# Patient Record
Sex: FEMALE | Race: WHITE | NOT HISPANIC OR LATINO
[De-identification: names, ages, dates, MRNs, and addresses within clinical notes are randomized per-mention and may not be internally consistent; named-entity substitution may affect disease eponyms.]

---

## 2021-03-09 PROBLEM — Z00.00 ENCOUNTER FOR PREVENTIVE HEALTH EXAMINATION: Status: ACTIVE | Noted: 2021-03-09

## 2021-03-29 ENCOUNTER — NON-APPOINTMENT (OUTPATIENT)
Age: 36
End: 2021-03-29

## 2021-03-29 ENCOUNTER — APPOINTMENT (OUTPATIENT)
Dept: OBGYN | Facility: CLINIC | Age: 36
End: 2021-03-29
Payer: COMMERCIAL

## 2021-03-29 ENCOUNTER — ASOB RESULT (OUTPATIENT)
Age: 36
End: 2021-03-29

## 2021-03-29 ENCOUNTER — APPOINTMENT (OUTPATIENT)
Dept: ANTEPARTUM | Facility: CLINIC | Age: 36
End: 2021-03-29
Payer: COMMERCIAL

## 2021-03-29 VITALS
WEIGHT: 185 LBS | DIASTOLIC BLOOD PRESSURE: 90 MMHG | BODY MASS INDEX: 29.03 KG/M2 | HEIGHT: 67 IN | SYSTOLIC BLOOD PRESSURE: 140 MMHG

## 2021-03-29 DIAGNOSIS — Z78.9 OTHER SPECIFIED HEALTH STATUS: ICD-10-CM

## 2021-03-29 DIAGNOSIS — Z82.0 FAMILY HISTORY OF EPILEPSY AND OTHER DISEASES OF THE NERVOUS SYSTEM: ICD-10-CM

## 2021-03-29 DIAGNOSIS — Z82.3 FAMILY HISTORY OF STROKE: ICD-10-CM

## 2021-03-29 DIAGNOSIS — D25.9 LEIOMYOMA OF UTERUS, UNSPECIFIED: ICD-10-CM

## 2021-03-29 DIAGNOSIS — Z80.41 FAMILY HISTORY OF MALIGNANT NEOPLASM OF OVARY: ICD-10-CM

## 2021-03-29 DIAGNOSIS — Z82.49 FAMILY HISTORY OF ISCHEMIC HEART DISEASE AND OTHER DISEASES OF THE CIRCULATORY SYSTEM: ICD-10-CM

## 2021-03-29 PROCEDURE — 99072 ADDL SUPL MATRL&STAF TM PHE: CPT

## 2021-03-29 PROCEDURE — 76801 OB US < 14 WKS SINGLE FETUS: CPT

## 2021-03-29 PROCEDURE — 0500F INITIAL PRENATAL CARE VISIT: CPT

## 2021-03-29 PROCEDURE — 36415 COLL VENOUS BLD VENIPUNCTURE: CPT

## 2021-03-29 NOTE — HISTORY OF PRESENT ILLNESS
[N] : Patient does not use contraception [Monogamous (Male Partner)] : is monogamous with a male partner [Y] : Positive pregnancy history [Regular Cycle Intervals] : periods have been regular [Frequency: Q ___ days] : menstrual periods occur approximately every [unfilled] days [Menarche Age: ____] : age at menarche was [unfilled] [Menstrual Cramps] : menstrual cramps [Currently Active] : currently active [Men] : men [No] : No [LMPDate] : 1/31/2021 [MensesFreq] : 28 [MensesLength] : 5-6 [PGxTotal] : 1 [FreeTextEntry1] : 1/31/2021

## 2021-03-30 LAB
ABO + RH PNL BLD: NORMAL
BASOPHILS # BLD AUTO: 0.04 K/UL
BASOPHILS NFR BLD AUTO: 0.5 %
BLD GP AB SCN SERPL QL: NORMAL
C TRACH RRNA SPEC QL NAA+PROBE: NOT DETECTED
CMV IGG SERPL QL: <0.2 U/ML
CMV IGG SERPL-IMP: NEGATIVE
CMV IGM SERPL QL: <8 AU/ML
CMV IGM SERPL QL: NEGATIVE
CREAT SPEC-SCNC: 30 MG/DL
CREAT/PROT UR: NORMAL RATIO
EOSINOPHIL # BLD AUTO: 0.11 K/UL
EOSINOPHIL NFR BLD AUTO: 1.2 %
HBV SURFACE AG SER QL: NONREACTIVE
HCT VFR BLD CALC: 42.7 %
HCV AB SER QL: NONREACTIVE
HCV S/CO RATIO: 0.08 S/CO
HGB A MFR BLD: 97.3 %
HGB A2 MFR BLD: 2.7 %
HGB BLD-MCNC: 14 G/DL
HGB FRACT BLD-IMP: NORMAL
HIV1+2 AB SPEC QL IA.RAPID: NONREACTIVE
HSV 1+2 IGG SER IA-IMP: NEGATIVE
HSV 1+2 IGG SER IA-IMP: POSITIVE
HSV1 IGG SER QL: 2.41 INDEX
HSV2 IGG SER QL: 0.15 INDEX
IMM GRANULOCYTES NFR BLD AUTO: 0.2 %
LYMPHOCYTES # BLD AUTO: 1.83 K/UL
LYMPHOCYTES NFR BLD AUTO: 20.6 %
MAN DIFF?: NORMAL
MCHC RBC-ENTMCNC: 29.5 PG
MCHC RBC-ENTMCNC: 32.8 GM/DL
MCV RBC AUTO: 89.9 FL
MEV IGG FLD QL IA: 85.2 AU/ML
MEV IGG+IGM SER-IMP: POSITIVE
MONOCYTES # BLD AUTO: 0.68 K/UL
MONOCYTES NFR BLD AUTO: 7.7 %
N GONORRHOEA RRNA SPEC QL NAA+PROBE: NOT DETECTED
NEUTROPHILS # BLD AUTO: 6.19 K/UL
NEUTROPHILS NFR BLD AUTO: 69.8 %
PLATELET # BLD AUTO: 336 K/UL
PROT UR-MCNC: <4 MG/DL
RBC # BLD: 4.75 M/UL
RBC # FLD: 13.4 %
RUBV IGG FLD-ACNC: 2.1 INDEX
RUBV IGG SER-IMP: POSITIVE
SOURCE AMPLIFICATION: NORMAL
T GONDII AB SER-IMP: NEGATIVE
T GONDII AB SER-IMP: POSITIVE
T GONDII IGG SER QL: 18.2 IU/ML
T GONDII IGM SER QL: <3 AU/ML
T PALLIDUM AB SER QL IA: NEGATIVE
VZV AB TITR SER: POSITIVE
VZV IGG SER IF-ACNC: 598.1 INDEX
WBC # FLD AUTO: 8.87 K/UL

## 2021-03-31 LAB
ALBUMIN SERPL ELPH-MCNC: 4.4 G/DL
ALP BLD-CCNC: 46 U/L
ALT SERPL-CCNC: 25 U/L
ANION GAP SERPL CALC-SCNC: 16 MMOL/L
AST SERPL-CCNC: 25 U/L
BACTERIA UR CULT: NORMAL
BILIRUB SERPL-MCNC: 0.2 MG/DL
BUN SERPL-MCNC: 9 MG/DL
CALCIUM SERPL-MCNC: 9.7 MG/DL
CHLORIDE SERPL-SCNC: 105 MMOL/L
CO2 SERPL-SCNC: 18 MMOL/L
CREAT SERPL-MCNC: 0.77 MG/DL
GLUCOSE SERPL-MCNC: 101 MG/DL
POTASSIUM SERPL-SCNC: 3.7 MMOL/L
PROT SERPL-MCNC: 7.6 G/DL
RUBV IGM FLD-ACNC: <20 AU/ML
SODIUM SERPL-SCNC: 140 MMOL/L
TSH SERPL-ACNC: 2.83 UIU/ML
URATE SERPL-MCNC: 3.8 MG/DL

## 2021-04-01 LAB
B19V IGG SER QL IA: 4.06 INDEX
B19V IGG+IGM SER-IMP: NORMAL
B19V IGG+IGM SER-IMP: POSITIVE
B19V IGM FLD-ACNC: 0.15 INDEX
B19V IGM SER-ACNC: NEGATIVE
CYTOLOGY CVX/VAG DOC THIN PREP: NORMAL
HSV1 IGM SER QL: NORMAL TITER
HSV2 AB FLD-ACNC: NORMAL TITER
MEV IGM SER QL: <0.91 ISR
VZV IGM SER IF-ACNC: <0.91 INDEX

## 2021-04-04 ENCOUNTER — TRANSCRIPTION ENCOUNTER (OUTPATIENT)
Age: 36
End: 2021-04-04

## 2021-04-08 LAB — HEXOSAMINIDASE B CFR FIB: NEGATIVE

## 2021-04-12 ENCOUNTER — NON-APPOINTMENT (OUTPATIENT)
Age: 36
End: 2021-04-12

## 2021-04-12 ENCOUNTER — APPOINTMENT (OUTPATIENT)
Dept: OBGYN | Facility: CLINIC | Age: 36
End: 2021-04-12
Payer: COMMERCIAL

## 2021-04-12 VITALS
BODY MASS INDEX: 29.8 KG/M2 | HEIGHT: 67 IN | SYSTOLIC BLOOD PRESSURE: 140 MMHG | DIASTOLIC BLOOD PRESSURE: 80 MMHG | WEIGHT: 189.88 LBS

## 2021-04-12 PROCEDURE — 0502F SUBSEQUENT PRENATAL CARE: CPT

## 2021-04-12 PROCEDURE — 36415 COLL VENOUS BLD VENIPUNCTURE: CPT

## 2021-04-15 LAB
AR GENE MUT ANL BLD/T: NEGATIVE
CFTR MUT TESTED BLD/T: NEGATIVE
GENE DIS ANL CARRIER INTERP-IMP: NEGATIVE
SMN1 GENE MUT ANL BLD/T: NORMAL

## 2021-04-26 ENCOUNTER — NON-APPOINTMENT (OUTPATIENT)
Age: 36
End: 2021-04-26

## 2021-04-26 ENCOUNTER — APPOINTMENT (OUTPATIENT)
Dept: ANTEPARTUM | Facility: CLINIC | Age: 36
End: 2021-04-26
Payer: COMMERCIAL

## 2021-04-26 ENCOUNTER — ASOB RESULT (OUTPATIENT)
Age: 36
End: 2021-04-26

## 2021-04-26 PROCEDURE — 76813 OB US NUCHAL MEAS 1 GEST: CPT

## 2021-04-26 PROCEDURE — 99072 ADDL SUPL MATRL&STAF TM PHE: CPT

## 2021-04-26 PROCEDURE — 76801 OB US < 14 WKS SINGLE FETUS: CPT

## 2021-05-04 ENCOUNTER — APPOINTMENT (OUTPATIENT)
Dept: ANTEPARTUM | Facility: CLINIC | Age: 36
End: 2021-05-04
Payer: COMMERCIAL

## 2021-05-04 ENCOUNTER — ASOB RESULT (OUTPATIENT)
Age: 36
End: 2021-05-04

## 2021-05-04 PROCEDURE — 59015 CHORION BIOPSY: CPT

## 2021-05-04 PROCEDURE — 99072 ADDL SUPL MATRL&STAF TM PHE: CPT

## 2021-05-04 PROCEDURE — 76945 ECHO GUIDE VILLUS SAMPLING: CPT

## 2021-05-10 ENCOUNTER — NON-APPOINTMENT (OUTPATIENT)
Age: 36
End: 2021-05-10

## 2021-05-10 ENCOUNTER — APPOINTMENT (OUTPATIENT)
Dept: OBGYN | Facility: CLINIC | Age: 36
End: 2021-05-10
Payer: COMMERCIAL

## 2021-05-10 VITALS
DIASTOLIC BLOOD PRESSURE: 100 MMHG | WEIGHT: 188.13 LBS | BODY MASS INDEX: 29.46 KG/M2 | SYSTOLIC BLOOD PRESSURE: 130 MMHG

## 2021-05-10 PROCEDURE — 0502F SUBSEQUENT PRENATAL CARE: CPT

## 2021-05-13 ENCOUNTER — APPOINTMENT (OUTPATIENT)
Dept: MATERNAL FETAL MEDICINE | Facility: CLINIC | Age: 36
End: 2021-05-13
Payer: COMMERCIAL

## 2021-05-13 PROCEDURE — 99205 OFFICE O/P NEW HI 60 MIN: CPT | Mod: 95

## 2021-05-25 ENCOUNTER — APPOINTMENT (OUTPATIENT)
Dept: ANTEPARTUM | Facility: CLINIC | Age: 36
End: 2021-05-25
Payer: COMMERCIAL

## 2021-05-25 ENCOUNTER — ASOB RESULT (OUTPATIENT)
Age: 36
End: 2021-05-25

## 2021-05-25 PROCEDURE — 76811 OB US DETAILED SNGL FETUS: CPT

## 2021-05-25 PROCEDURE — 76817 TRANSVAGINAL US OBSTETRIC: CPT

## 2021-05-26 NOTE — DISCUSSION/SUMMARY
[FreeTextEntry1] : 1. Multiple risk factors for adverse pregnancy outcome including advanced maternal age, inconclusive NIPT followed by normal NIPT with a low fetal fraction, low KIM-A and chronic hypertension\par \par While the above factors increase the risk of adverse pregnancy outcomes such as early and late pregnancy loss, fetal growth restriction, placental insufficiency and preeclampsia these factor perform poorly in predicting which patients will have adverse outcomes and if they will be early in onset or late (which substantially impacts the  prognosis). In addition there is still a good chance of having an uncomplicated pregnancy. While some of these adverse pregnancy outcomes could occur the fetal/ prognosis depends on the severity of the issue, gestational age at delivery,  weight and other factors where an adverse outcome may occur but at a gestational age where the impact on long term childhood outcome is negligible. We also discussed the possibility of a worrisome outcomes such as early onset fetal growth restriction or preeclampsia where the likelihood of a more significant negative outcome is higher. Unfortunately, we are unable to predict how the pregnancy will progress. We discussed the option of , after the patient brought up the option, with a plan to revisit our discussion in the late 2nd trimester.\par \par 2. Chronic hypertension\par Suspect chronic or essential hypertension (especially with a h/o HTN in her father). HTN can present in cases of placental insufficiency but it would be rare for this to explain the new onset HTN diagnosed in early pregnancy. Preexisting hypertension is a recognized risk factor for preeclampsia, and superimposed preeclampsia develops in 20 to 50% of women. Women who develop superimposed preeclampsia have higher rates of adverse maternal-fetal outcomes, but the independent risks associated with uncomplicated chronic hypertension are less clear. Chronic HTN is associated with an increased risk of GDM, fetal growth restriction, placental abruption, and  mortality (most of this risk is attributable to superimposed preeclampsia rather than uncomplicated chronic hypertension).\par \par We discussed the general course of chronic hypertension in pregnancy including lowering of BP in the first and second trimesters with a return to near baseline values in the late-second or early-third trimester. We also discussed that serial assessment of fetal growth in the third trimester would be recommended, along with increased fetal surveillance in the late third trimester (fetal non-stress test and/or fetal biophysical profile). The signs and symptoms of preeclampsia were reviewed. Home BP surveillance recommended and BP parameters reviewed. \par \par Recommendations:\par 1. Check maternal EKG given history of chronic hypertension. Low threshold for maternal echocardiogram if any abnormalities noted.\par 2. Early fetal anatomy sonogram as scheduled\par 3. Home BP surveillance with BP diary. BP parameters reviewed.

## 2021-05-26 NOTE — HISTORY OF PRESENT ILLNESS
[Home] : at home, [unfilled] , at the time of the visit. [Medical Office: ([facility])___] : at the medical office located in  [Verbal consent obtained from patient] : the patient, [unfilled] [FreeTextEntry1] : Maternal-Fetal Medicine consultation \par Prenatal care: Dr. Montelongo\par \par Chief compliant: Multiple risk factors for adverse pregnancy outcome\par \par ARNULFO HERNÁNDEZ is a 35 year  at 14w4d (LEANNE 21) that presents for a MFM consultation due to multiple risk factors for adverse pregnancy outcome. Prenatal course significant for inconclusive NIPT with repeat demonstrating a normal result, though fetal fraction was low at 2.8%. KIM-A from 1st trimester aneuploidy screening low. The patient underwent a CVS with normal fetal karyotype, microarray pending. \par \par Medical history significant for hypertension which was diagnosed in this pregnancy and was started on labetalol 100 mg BID.\par \par Surgical history is otherwise unremarkable. Gynecologic history is notable for fibroids. Current medications include prenatal vitamins, labetalol 100 mg BID, aspirin 162 mg daily (stopped after CVS). Allergies: NKDA\par \par She works as psychotherapist. She lives with her . She denies a history of tobacco use. She denies alcohol or drug use in this pregnancy. She has never received blood products but is willing to receive them if needed. \par \par Family history is notable for HTN in her father. Mother has a cardiac arrhythmia and h/o DVTs in pregnancy. Her older brother has ADHD and HTN. She denies a family history of birth defects, mental retardation, developmental delay or genetic disorders.

## 2021-06-01 ENCOUNTER — ASOB RESULT (OUTPATIENT)
Age: 36
End: 2021-06-01

## 2021-06-01 ENCOUNTER — APPOINTMENT (OUTPATIENT)
Dept: ANTEPARTUM | Facility: CLINIC | Age: 36
End: 2021-06-01
Payer: COMMERCIAL

## 2021-06-01 PROCEDURE — 59000 AMNIOCENTESIS DIAGNOSTIC: CPT

## 2021-06-01 PROCEDURE — 76946 ECHO GUIDE FOR AMNIOCENTESIS: CPT

## 2021-06-03 ENCOUNTER — NON-APPOINTMENT (OUTPATIENT)
Age: 36
End: 2021-06-03

## 2021-06-03 ENCOUNTER — APPOINTMENT (OUTPATIENT)
Dept: OBGYN | Facility: CLINIC | Age: 36
End: 2021-06-03
Payer: COMMERCIAL

## 2021-06-03 ENCOUNTER — APPOINTMENT (OUTPATIENT)
Dept: MATERNAL FETAL MEDICINE | Facility: CLINIC | Age: 36
End: 2021-06-03
Payer: COMMERCIAL

## 2021-06-03 VITALS
BODY MASS INDEX: 29.03 KG/M2 | SYSTOLIC BLOOD PRESSURE: 122 MMHG | DIASTOLIC BLOOD PRESSURE: 78 MMHG | WEIGHT: 185 LBS | HEIGHT: 67 IN

## 2021-06-03 PROCEDURE — 0502F SUBSEQUENT PRENATAL CARE: CPT

## 2021-06-03 PROCEDURE — 99214 OFFICE O/P EST MOD 30 MIN: CPT | Mod: 95

## 2021-06-04 NOTE — DISCUSSION/SUMMARY
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 We reviewed the importance of amniocentesis to confirm if confined placental mosaicism (CPM), the results of which are pending. We also discussed the importance of follow-up ultrasounds to evaluate for any evidence of fetal malformations. We reviewed general risks and management of confined placental mosaicism.\par \par Ms. Nathan had several questions related to the possibility of a cell-line in the fetus which could still be mosaic and not detected on amniocentesis. In general we reviewed the favorable prognosis of a confined placental mosaicism though this also depends on the type of CPM, 1, 2 or 3. At this time the type of CPM could not be clarified with [facility]/genetic counseling. All her questions were answered to the best of my ability. A consultation with a medical geneticist was recommended and referral information for several providers given.\par \par While CPM, maternal age and cHTN increase the risk of adverse pregnancy outcomes such as early and late pregnancy loss, fetal growth restriction, placental insufficiency and preeclampsia these factor perform poorly in predicting which patients will have adverse outcomes and if they will be early in onset or late (which substantially impacts the  prognosis). In addition there is still a good chance of having an uncomplicated pregnancy. While some of these adverse pregnancy outcomes could occur the fetal/ prognosis depends on the severity of the issue, gestational age at delivery,  weight and other factors where an adverse outcome may occur but at a gestational age where the impact on long term childhood outcome is negligible. We also discussed the possibility of a worrisome outcomes such as early onset fetal growth restriction or preeclampsia where the likelihood of a more significant negative outcome is higher. Unfortunately, we are unable to predict how the pregnancy will progress. We discussed the option of  again with a plan to revisit our discussion in the late 2nd trimester.\par \par \par Recommendations:\par 1. Check maternal EKG given history of chronic hypertension. Low threshold for maternal echocardiogram if any abnormalities noted.\par 2. Continue home BP surveillance with BP diary. BP parameters reviewed. \par 3. 20-21 week anatomy sonogram\par 4. Consultation with a medical geneticist once results of amniocentesis including karyotype and microarray available.

## 2021-06-04 NOTE — DATA REVIEWED
[FreeTextEntry1] : BP log reviewed today (6/3), BPs in desired range\par \par 3/2021 labs\par Serum creatinine 0.77\par TSH 2.83\par Spot urine protein to creatinine ratio <0.133\par \par 5/2021\par CVS: Normal male karyotype, microarray low-level mosaicism T18

## 2021-06-04 NOTE — HISTORY OF PRESENT ILLNESS
[Home] : at home, [unfilled] , at the time of the visit. [Medical Office: ([facility])___] : at the medical office located in  [Verbal consent obtained from patient] : the patient, [unfilled] [FreeTextEntry1] : Maternal-Fetal Medicine consultation \par Prenatal care: Dr. Montelongo\par \par Chief compliant: Multiple risk factors for adverse pregnancy outcome\par \par ARNULFO HERNÁNDEZ is a 35 year  at 17w4d (LEANNE 21) that presents for a MFM consultation due to multiple risk factors for adverse pregnancy outcome. Since our last consultation CVS microarray notable for low-level mosaicism with T18. She had emailed me with multiple questions related to this finding and we set up a follow-up consultation to discuss in more detail.\par \par \par AP issues:\par 1. 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 cHTN, labetalol 100 mg BID, performing home BP monitoring\par \par \par Height: 5'8" Prepregnancy weight: ~, 185#\par BMI: 28

## 2021-06-08 LAB
1ST TRIMESTER DATA: NORMAL
2ND TRIMESTER DATA: NORMAL
ADDENDUM DOC: NORMAL
AFP PNL SERPL: NORMAL
AFP SERPL-ACNC: NORMAL
AFP SERPL-ACNC: NORMAL
B-HCG FREE SERPL-MCNC: NORMAL
CLINICAL BIOCHEMIST REVIEW: NORMAL
FREE BETA HCG 1ST TRIMESTER: NORMAL
INHIBIN A SERPL-MCNC: NORMAL
NOTES NTD: NORMAL
NT: NORMAL
PAPP-A SERPL-ACNC: NORMAL
U ESTRIOL SERPL-SCNC: NORMAL

## 2021-06-23 ENCOUNTER — NON-APPOINTMENT (OUTPATIENT)
Age: 36
End: 2021-06-23

## 2021-06-23 ENCOUNTER — APPOINTMENT (OUTPATIENT)
Dept: OBGYN | Facility: CLINIC | Age: 36
End: 2021-06-23
Payer: COMMERCIAL

## 2021-06-23 VITALS
SYSTOLIC BLOOD PRESSURE: 110 MMHG | BODY MASS INDEX: 30.64 KG/M2 | WEIGHT: 195.63 LBS | DIASTOLIC BLOOD PRESSURE: 80 MMHG

## 2021-06-23 PROCEDURE — 0502F SUBSEQUENT PRENATAL CARE: CPT

## 2021-06-30 ENCOUNTER — ASOB RESULT (OUTPATIENT)
Age: 36
End: 2021-06-30

## 2021-06-30 ENCOUNTER — APPOINTMENT (OUTPATIENT)
Dept: ANTEPARTUM | Facility: CLINIC | Age: 36
End: 2021-06-30
Payer: COMMERCIAL

## 2021-06-30 PROCEDURE — 76816 OB US FOLLOW-UP PER FETUS: CPT

## 2021-07-16 ENCOUNTER — NON-APPOINTMENT (OUTPATIENT)
Age: 36
End: 2021-07-16

## 2021-07-19 ENCOUNTER — NON-APPOINTMENT (OUTPATIENT)
Age: 36
End: 2021-07-19

## 2021-07-19 ENCOUNTER — APPOINTMENT (OUTPATIENT)
Dept: OBGYN | Facility: CLINIC | Age: 36
End: 2021-07-19
Payer: COMMERCIAL

## 2021-07-19 VITALS
WEIGHT: 196 LBS | BODY MASS INDEX: 30.76 KG/M2 | HEIGHT: 67 IN | DIASTOLIC BLOOD PRESSURE: 60 MMHG | SYSTOLIC BLOOD PRESSURE: 120 MMHG

## 2021-07-19 PROCEDURE — 0502F SUBSEQUENT PRENATAL CARE: CPT

## 2021-07-20 LAB
BASOPHILS # BLD AUTO: 0.03 K/UL
BASOPHILS NFR BLD AUTO: 0.3 %
EOSINOPHIL # BLD AUTO: 0.24 K/UL
EOSINOPHIL NFR BLD AUTO: 2.1 %
ESTIMATED AVERAGE GLUCOSE: 100 MG/DL
GLUCOSE 1H P 50 G GLC PO SERPL-MCNC: 97 MG/DL
HBA1C MFR BLD HPLC: 5.1 %
HCT VFR BLD CALC: 39.6 %
HGB BLD-MCNC: 12.2 G/DL
IMM GRANULOCYTES NFR BLD AUTO: 0.7 %
LYMPHOCYTES # BLD AUTO: 1.53 K/UL
LYMPHOCYTES NFR BLD AUTO: 13.2 %
MAN DIFF?: NORMAL
MCHC RBC-ENTMCNC: 29.8 PG
MCHC RBC-ENTMCNC: 30.8 GM/DL
MCV RBC AUTO: 96.8 FL
MONOCYTES # BLD AUTO: 0.65 K/UL
MONOCYTES NFR BLD AUTO: 5.6 %
NEUTROPHILS # BLD AUTO: 9.08 K/UL
NEUTROPHILS NFR BLD AUTO: 78.1 %
PLATELET # BLD AUTO: 330 K/UL
RBC # BLD: 4.09 M/UL
RBC # FLD: 14.2 %
WBC # FLD AUTO: 11.61 K/UL

## 2021-07-21 LAB — BACTERIA UR CULT: NORMAL

## 2021-07-26 ENCOUNTER — ASOB RESULT (OUTPATIENT)
Age: 36
End: 2021-07-26

## 2021-07-26 ENCOUNTER — APPOINTMENT (OUTPATIENT)
Dept: ANTEPARTUM | Facility: CLINIC | Age: 36
End: 2021-07-26
Payer: COMMERCIAL

## 2021-07-26 PROCEDURE — 76816 OB US FOLLOW-UP PER FETUS: CPT

## 2021-07-26 PROCEDURE — 76819 FETAL BIOPHYS PROFIL W/O NST: CPT

## 2021-08-11 ENCOUNTER — NON-APPOINTMENT (OUTPATIENT)
Age: 36
End: 2021-08-11

## 2021-08-12 ENCOUNTER — APPOINTMENT (OUTPATIENT)
Dept: OBGYN | Facility: CLINIC | Age: 36
End: 2021-08-12
Payer: COMMERCIAL

## 2021-08-12 VITALS — BODY MASS INDEX: 31.48 KG/M2 | WEIGHT: 201 LBS | SYSTOLIC BLOOD PRESSURE: 130 MMHG | DIASTOLIC BLOOD PRESSURE: 90 MMHG

## 2021-08-12 PROCEDURE — 0502F SUBSEQUENT PRENATAL CARE: CPT

## 2021-08-16 LAB
ALBUMIN SERPL ELPH-MCNC: 3.9 G/DL
ALBUMIN SERPL ELPH-MCNC: 3.9 G/DL
ALP BLD-CCNC: 53 U/L
ALT SERPL-CCNC: 20 U/L
ANION GAP SERPL CALC-SCNC: 12 MMOL/L
AST SERPL-CCNC: 19 U/L
BASOPHILS # BLD AUTO: 0.04 K/UL
BASOPHILS NFR BLD AUTO: 0.4 %
BILIRUB DIRECT SERPL-MCNC: 0.1 MG/DL
BILIRUB INDIRECT SERPL-MCNC: 0.1 MG/DL
BILIRUB SERPL-MCNC: 0.2 MG/DL
BUN SERPL-MCNC: 5 MG/DL
CALCIUM SERPL-MCNC: 9.5 MG/DL
CHLORIDE SERPL-SCNC: 103 MMOL/L
CO2 SERPL-SCNC: 22 MMOL/L
CREAT SERPL-MCNC: 0.54 MG/DL
EOSINOPHIL # BLD AUTO: 0.22 K/UL
EOSINOPHIL NFR BLD AUTO: 2.2 %
GLUCOSE SERPL-MCNC: 102 MG/DL
HCT VFR BLD CALC: 40 %
HGB BLD-MCNC: 12.2 G/DL
IMM GRANULOCYTES NFR BLD AUTO: 0.9 %
LYMPHOCYTES # BLD AUTO: 1.43 K/UL
LYMPHOCYTES NFR BLD AUTO: 14.3 %
MAN DIFF?: NORMAL
MCHC RBC-ENTMCNC: 30 PG
MCHC RBC-ENTMCNC: 30.5 GM/DL
MCV RBC AUTO: 98.5 FL
MONOCYTES # BLD AUTO: 0.57 K/UL
MONOCYTES NFR BLD AUTO: 5.7 %
NEUTROPHILS # BLD AUTO: 7.68 K/UL
NEUTROPHILS NFR BLD AUTO: 76.5 %
PHOSPHATE SERPL-MCNC: 3.6 MG/DL
PLATELET # BLD AUTO: 323 K/UL
POTASSIUM SERPL-SCNC: 4.5 MMOL/L
PROT SERPL-MCNC: 6.5 G/DL
RBC # BLD: 4.06 M/UL
RBC # FLD: 14.3 %
SODIUM SERPL-SCNC: 137 MMOL/L
URATE SERPL-MCNC: 3.7 MG/DL
WBC # FLD AUTO: 10.03 K/UL

## 2021-08-18 LAB
CREAT 24H UR-MCNC: 0.8 G/24 H
CREAT ?TM UR-MCNC: 49 MG/DL
PROT 24H UR-MRATE: 7 MG/DL
PROT ?TM UR-MCNC: 24 HR
PROT UR-MCNC: 112 MG/24 H
SPECIMEN VOL 24H UR: 1600 ML

## 2021-08-23 ENCOUNTER — APPOINTMENT (OUTPATIENT)
Dept: ANTEPARTUM | Facility: CLINIC | Age: 36
End: 2021-08-23
Payer: COMMERCIAL

## 2021-08-23 ENCOUNTER — ASOB RESULT (OUTPATIENT)
Age: 36
End: 2021-08-23

## 2021-08-23 PROCEDURE — 76816 OB US FOLLOW-UP PER FETUS: CPT

## 2021-08-23 PROCEDURE — 76819 FETAL BIOPHYS PROFIL W/O NST: CPT

## 2021-08-26 ENCOUNTER — APPOINTMENT (OUTPATIENT)
Dept: OBGYN | Facility: CLINIC | Age: 36
End: 2021-08-26
Payer: COMMERCIAL

## 2021-08-26 VITALS
BODY MASS INDEX: 32.15 KG/M2 | SYSTOLIC BLOOD PRESSURE: 120 MMHG | WEIGHT: 205.25 LBS | DIASTOLIC BLOOD PRESSURE: 80 MMHG

## 2021-08-26 PROCEDURE — 0502F SUBSEQUENT PRENATAL CARE: CPT

## 2021-09-08 ENCOUNTER — APPOINTMENT (OUTPATIENT)
Dept: OBGYN | Facility: CLINIC | Age: 36
End: 2021-09-08
Payer: COMMERCIAL

## 2021-09-08 VITALS — WEIGHT: 205 LBS | BODY MASS INDEX: 32.11 KG/M2 | SYSTOLIC BLOOD PRESSURE: 118 MMHG | DIASTOLIC BLOOD PRESSURE: 86 MMHG

## 2021-09-08 PROCEDURE — 0502F SUBSEQUENT PRENATAL CARE: CPT

## 2021-09-20 ENCOUNTER — APPOINTMENT (OUTPATIENT)
Dept: ANTEPARTUM | Facility: CLINIC | Age: 36
End: 2021-09-20
Payer: COMMERCIAL

## 2021-09-20 ENCOUNTER — ASOB RESULT (OUTPATIENT)
Age: 36
End: 2021-09-20

## 2021-09-20 PROCEDURE — 76819 FETAL BIOPHYS PROFIL W/O NST: CPT

## 2021-09-20 PROCEDURE — 76816 OB US FOLLOW-UP PER FETUS: CPT

## 2021-09-21 ENCOUNTER — NON-APPOINTMENT (OUTPATIENT)
Age: 36
End: 2021-09-21

## 2021-09-21 ENCOUNTER — APPOINTMENT (OUTPATIENT)
Dept: OBGYN | Facility: CLINIC | Age: 36
End: 2021-09-21
Payer: COMMERCIAL

## 2021-09-21 VITALS
WEIGHT: 211.38 LBS | BODY MASS INDEX: 33.11 KG/M2 | SYSTOLIC BLOOD PRESSURE: 140 MMHG | DIASTOLIC BLOOD PRESSURE: 80 MMHG

## 2021-09-21 PROCEDURE — 0502F SUBSEQUENT PRENATAL CARE: CPT

## 2021-09-28 ENCOUNTER — APPOINTMENT (OUTPATIENT)
Dept: OBGYN | Facility: CLINIC | Age: 36
End: 2021-09-28
Payer: COMMERCIAL

## 2021-09-28 VITALS
BODY MASS INDEX: 32.56 KG/M2 | DIASTOLIC BLOOD PRESSURE: 100 MMHG | SYSTOLIC BLOOD PRESSURE: 130 MMHG | WEIGHT: 207.88 LBS

## 2021-09-28 PROCEDURE — 0502F SUBSEQUENT PRENATAL CARE: CPT

## 2021-10-04 ENCOUNTER — ASOB RESULT (OUTPATIENT)
Age: 36
End: 2021-10-04

## 2021-10-04 ENCOUNTER — APPOINTMENT (OUTPATIENT)
Dept: ANTEPARTUM | Facility: CLINIC | Age: 36
End: 2021-10-04
Payer: COMMERCIAL

## 2021-10-04 PROCEDURE — 76816 OB US FOLLOW-UP PER FETUS: CPT

## 2021-10-05 ENCOUNTER — APPOINTMENT (OUTPATIENT)
Dept: MATERNAL FETAL MEDICINE | Facility: CLINIC | Age: 36
End: 2021-10-05
Payer: COMMERCIAL

## 2021-10-05 PROCEDURE — 99213 OFFICE O/P EST LOW 20 MIN: CPT

## 2021-10-05 NOTE — HISTORY OF PRESENT ILLNESS
[FreeTextEntry1] : Maternal-Fetal Medicine consultation \par Prenatal care: Dr. Montelongo\par \par Chief compliant: Multiple risk factors for adverse pregnancy outcome\par \par ARNULFO HERNÁNDEZ is a 35 year  at 33w6d (LEANNE 21) that presents for a MFM consultation due to multiple risk factors for adverse pregnancy outcome. 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 \par \par \par AP issues:\par 1. 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 \par Fetal growth has been normal. Now undergoing weekly fetal surveillance. \par \par 2. cHTN, labetalol 100 mg BID, performing home BP monitoring. Increased to 300 mg TID last week. \par Signs and symptoms of preeclampsia reviewed. \par Delivery planned in 38th week\par \par Wt 208, /84, HR 79\par \par Height: 5'8" Prepregnancy weight: ~, 185#\par BMI: 28

## 2021-10-05 NOTE — DATA REVIEWED
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

## 2021-10-05 NOTE — DISCUSSION/SUMMARY
[FreeTextEntry1] : \par \par Recommendations:\par 1. EKG ordered\par 2. Continue home BP surveillance with BP diary. BP parameters reviewed. Preeclamptic precautions reviewed

## 2021-10-11 ENCOUNTER — APPOINTMENT (OUTPATIENT)
Dept: ANTEPARTUM | Facility: CLINIC | Age: 36
End: 2021-10-11
Payer: COMMERCIAL

## 2021-10-11 ENCOUNTER — NON-APPOINTMENT (OUTPATIENT)
Age: 36
End: 2021-10-11

## 2021-10-11 ENCOUNTER — APPOINTMENT (OUTPATIENT)
Dept: OBGYN | Facility: CLINIC | Age: 36
End: 2021-10-11
Payer: COMMERCIAL

## 2021-10-11 ENCOUNTER — ASOB RESULT (OUTPATIENT)
Age: 36
End: 2021-10-11

## 2021-10-11 ENCOUNTER — LABORATORY RESULT (OUTPATIENT)
Age: 36
End: 2021-10-11

## 2021-10-11 VITALS
SYSTOLIC BLOOD PRESSURE: 130 MMHG | DIASTOLIC BLOOD PRESSURE: 80 MMHG | BODY MASS INDEX: 32.64 KG/M2 | WEIGHT: 208.38 LBS

## 2021-10-11 PROCEDURE — 0502F SUBSEQUENT PRENATAL CARE: CPT

## 2021-10-11 PROCEDURE — 76816 OB US FOLLOW-UP PER FETUS: CPT

## 2021-10-11 PROCEDURE — 76818 FETAL BIOPHYS PROFILE W/NST: CPT

## 2021-10-14 ENCOUNTER — APPOINTMENT (OUTPATIENT)
Dept: HEART AND VASCULAR | Facility: CLINIC | Age: 36
End: 2021-10-14

## 2021-10-14 ENCOUNTER — APPOINTMENT (OUTPATIENT)
Dept: OBGYN | Facility: CLINIC | Age: 36
End: 2021-10-14

## 2021-10-18 ENCOUNTER — NON-APPOINTMENT (OUTPATIENT)
Age: 36
End: 2021-10-18

## 2021-10-18 ENCOUNTER — APPOINTMENT (OUTPATIENT)
Dept: OBGYN | Facility: CLINIC | Age: 36
End: 2021-10-18
Payer: COMMERCIAL

## 2021-10-18 ENCOUNTER — APPOINTMENT (OUTPATIENT)
Dept: ANTEPARTUM | Facility: CLINIC | Age: 36
End: 2021-10-18
Payer: COMMERCIAL

## 2021-10-18 ENCOUNTER — ASOB RESULT (OUTPATIENT)
Age: 36
End: 2021-10-18

## 2021-10-18 VITALS
DIASTOLIC BLOOD PRESSURE: 80 MMHG | SYSTOLIC BLOOD PRESSURE: 120 MMHG | WEIGHT: 210.38 LBS | BODY MASS INDEX: 32.95 KG/M2

## 2021-10-18 PROCEDURE — 76818 FETAL BIOPHYS PROFILE W/NST: CPT

## 2021-10-18 PROCEDURE — 76816 OB US FOLLOW-UP PER FETUS: CPT

## 2021-10-18 PROCEDURE — 0502F SUBSEQUENT PRENATAL CARE: CPT

## 2021-10-25 ENCOUNTER — ASOB RESULT (OUTPATIENT)
Age: 36
End: 2021-10-25

## 2021-10-25 ENCOUNTER — APPOINTMENT (OUTPATIENT)
Dept: ANTEPARTUM | Facility: CLINIC | Age: 36
End: 2021-10-25
Payer: COMMERCIAL

## 2021-10-25 PROCEDURE — 76816 OB US FOLLOW-UP PER FETUS: CPT

## 2021-10-25 PROCEDURE — 76818 FETAL BIOPHYS PROFILE W/NST: CPT

## 2021-10-26 ENCOUNTER — NON-APPOINTMENT (OUTPATIENT)
Age: 36
End: 2021-10-26

## 2021-10-26 ENCOUNTER — LABORATORY RESULT (OUTPATIENT)
Age: 36
End: 2021-10-26

## 2021-10-26 ENCOUNTER — APPOINTMENT (OUTPATIENT)
Dept: OBGYN | Facility: CLINIC | Age: 36
End: 2021-10-26
Payer: COMMERCIAL

## 2021-10-26 ENCOUNTER — OUTPATIENT (OUTPATIENT)
Dept: OUTPATIENT SERVICES | Facility: HOSPITAL | Age: 36
LOS: 1 days | End: 2021-10-26
Payer: COMMERCIAL

## 2021-10-26 VITALS
BODY MASS INDEX: 32.87 KG/M2 | WEIGHT: 209.88 LBS | DIASTOLIC BLOOD PRESSURE: 70 MMHG | SYSTOLIC BLOOD PRESSURE: 120 MMHG

## 2021-10-26 DIAGNOSIS — Z01.818 ENCOUNTER FOR OTHER PREPROCEDURAL EXAMINATION: ICD-10-CM

## 2021-10-26 LAB
ALBUMIN SERPL ELPH-MCNC: 3.7 G/DL — SIGNIFICANT CHANGE UP (ref 3.3–5)
ALP SERPL-CCNC: 73 U/L — SIGNIFICANT CHANGE UP (ref 40–120)
ALT FLD-CCNC: 10 U/L — SIGNIFICANT CHANGE UP (ref 10–45)
ANION GAP SERPL CALC-SCNC: 11 MMOL/L — SIGNIFICANT CHANGE UP (ref 5–17)
AST SERPL-CCNC: 16 U/L — SIGNIFICANT CHANGE UP (ref 10–40)
BILIRUB SERPL-MCNC: 0.3 MG/DL — SIGNIFICANT CHANGE UP (ref 0.2–1.2)
BLD GP AB SCN SERPL QL: NEGATIVE — SIGNIFICANT CHANGE UP
BLD GP AB SCN SERPL QL: NEGATIVE — SIGNIFICANT CHANGE UP
BUN SERPL-MCNC: 7 MG/DL — SIGNIFICANT CHANGE UP (ref 7–23)
CALCIUM SERPL-MCNC: 9.3 MG/DL — SIGNIFICANT CHANGE UP (ref 8.4–10.5)
CHLORIDE SERPL-SCNC: 102 MMOL/L — SIGNIFICANT CHANGE UP (ref 96–108)
CO2 SERPL-SCNC: 22 MMOL/L — SIGNIFICANT CHANGE UP (ref 22–31)
CREAT SERPL-MCNC: 0.61 MG/DL — SIGNIFICANT CHANGE UP (ref 0.5–1.3)
GLUCOSE SERPL-MCNC: 87 MG/DL — SIGNIFICANT CHANGE UP (ref 70–99)
POTASSIUM SERPL-MCNC: 4.6 MMOL/L — SIGNIFICANT CHANGE UP (ref 3.5–5.3)
POTASSIUM SERPL-SCNC: 4.6 MMOL/L — SIGNIFICANT CHANGE UP (ref 3.5–5.3)
PROT SERPL-MCNC: 6.3 G/DL — SIGNIFICANT CHANGE UP (ref 6–8.3)
RH IG SCN BLD-IMP: POSITIVE — SIGNIFICANT CHANGE UP
RH IG SCN BLD-IMP: POSITIVE — SIGNIFICANT CHANGE UP
SODIUM SERPL-SCNC: 135 MMOL/L — SIGNIFICANT CHANGE UP (ref 135–145)

## 2021-10-26 PROCEDURE — 86900 BLOOD TYPING SEROLOGIC ABO: CPT

## 2021-10-26 PROCEDURE — 86780 TREPONEMA PALLIDUM: CPT

## 2021-10-26 PROCEDURE — 85025 COMPLETE CBC W/AUTO DIFF WBC: CPT

## 2021-10-26 PROCEDURE — 80053 COMPREHEN METABOLIC PANEL: CPT

## 2021-10-26 PROCEDURE — 86850 RBC ANTIBODY SCREEN: CPT

## 2021-10-26 PROCEDURE — 86901 BLOOD TYPING SEROLOGIC RH(D): CPT

## 2021-10-26 PROCEDURE — 0502F SUBSEQUENT PRENATAL CARE: CPT

## 2021-10-27 ENCOUNTER — INPATIENT (INPATIENT)
Facility: HOSPITAL | Age: 36
LOS: 8 days | Discharge: HOME | End: 2021-11-05
Attending: OBSTETRICS & GYNECOLOGY | Admitting: OBSTETRICS & GYNECOLOGY
Payer: COMMERCIAL

## 2021-10-27 ENCOUNTER — TRANSCRIPTION ENCOUNTER (OUTPATIENT)
Age: 36
End: 2021-10-27

## 2021-10-27 VITALS — HEIGHT: 68 IN | WEIGHT: 209 LBS

## 2021-10-27 LAB
ALBUMIN SERPL ELPH-MCNC: 3.6 G/DL — SIGNIFICANT CHANGE UP (ref 3.3–5)
ALP SERPL-CCNC: 76 U/L — SIGNIFICANT CHANGE UP (ref 40–120)
ALT FLD-CCNC: 12 U/L — SIGNIFICANT CHANGE UP (ref 10–45)
ANION GAP SERPL CALC-SCNC: 11 MMOL/L — SIGNIFICANT CHANGE UP (ref 5–17)
APPEARANCE UR: CLEAR — SIGNIFICANT CHANGE UP
APTT BLD: 24.7 SEC — LOW (ref 27.5–35.5)
AST SERPL-CCNC: 18 U/L — SIGNIFICANT CHANGE UP (ref 10–40)
BASOPHILS # BLD AUTO: 0.03 K/UL — SIGNIFICANT CHANGE UP (ref 0–0.2)
BASOPHILS NFR BLD AUTO: 0.4 % — SIGNIFICANT CHANGE UP (ref 0–2)
BILIRUB SERPL-MCNC: 0.3 MG/DL — SIGNIFICANT CHANGE UP (ref 0.2–1.2)
BILIRUB UR-MCNC: NEGATIVE — SIGNIFICANT CHANGE UP
BLD GP AB SCN SERPL QL: NEGATIVE — SIGNIFICANT CHANGE UP
BUN SERPL-MCNC: 7 MG/DL — SIGNIFICANT CHANGE UP (ref 7–23)
CALCIUM SERPL-MCNC: 9.7 MG/DL — SIGNIFICANT CHANGE UP (ref 8.4–10.5)
CHLORIDE SERPL-SCNC: 103 MMOL/L — SIGNIFICANT CHANGE UP (ref 96–108)
CO2 SERPL-SCNC: 20 MMOL/L — LOW (ref 22–31)
COLOR SPEC: YELLOW — SIGNIFICANT CHANGE UP
COVID-19 SPIKE DOMAIN AB INTERP: POSITIVE
COVID-19 SPIKE DOMAIN ANTIBODY RESULT: >250 U/ML — HIGH
CREAT ?TM UR-MCNC: 55 MG/DL — SIGNIFICANT CHANGE UP
CREAT SERPL-MCNC: 0.53 MG/DL — SIGNIFICANT CHANGE UP (ref 0.5–1.3)
DIFF PNL FLD: NEGATIVE — SIGNIFICANT CHANGE UP
EOSINOPHIL # BLD AUTO: 0.06 K/UL — SIGNIFICANT CHANGE UP (ref 0–0.5)
EOSINOPHIL NFR BLD AUTO: 0.8 % — SIGNIFICANT CHANGE UP (ref 0–6)
FIBRINOGEN PPP-MCNC: 457 MG/DL — HIGH (ref 258–438)
GLUCOSE SERPL-MCNC: 132 MG/DL — HIGH (ref 70–99)
GLUCOSE UR QL: NEGATIVE — SIGNIFICANT CHANGE UP
HCT VFR BLD CALC: 37 % — SIGNIFICANT CHANGE UP (ref 34.5–45)
HGB BLD-MCNC: 12.1 G/DL — SIGNIFICANT CHANGE UP (ref 11.5–15.5)
IMM GRANULOCYTES NFR BLD AUTO: 0.8 % — SIGNIFICANT CHANGE UP (ref 0–1.5)
INR BLD: 0.93 — SIGNIFICANT CHANGE UP (ref 0.88–1.16)
KETONES UR-MCNC: NEGATIVE — SIGNIFICANT CHANGE UP
LDH SERPL L TO P-CCNC: 157 U/L — SIGNIFICANT CHANGE UP (ref 50–242)
LEUKOCYTE ESTERASE UR-ACNC: NEGATIVE — SIGNIFICANT CHANGE UP
LYMPHOCYTES # BLD AUTO: 1.24 K/UL — SIGNIFICANT CHANGE UP (ref 1–3.3)
LYMPHOCYTES # BLD AUTO: 16 % — SIGNIFICANT CHANGE UP (ref 13–44)
MCHC RBC-ENTMCNC: 28.9 PG — SIGNIFICANT CHANGE UP (ref 27–34)
MCHC RBC-ENTMCNC: 32.7 GM/DL — SIGNIFICANT CHANGE UP (ref 32–36)
MCV RBC AUTO: 88.3 FL — SIGNIFICANT CHANGE UP (ref 80–100)
MONOCYTES # BLD AUTO: 0.44 K/UL — SIGNIFICANT CHANGE UP (ref 0–0.9)
MONOCYTES NFR BLD AUTO: 5.7 % — SIGNIFICANT CHANGE UP (ref 2–14)
NEUTROPHILS # BLD AUTO: 5.94 K/UL — SIGNIFICANT CHANGE UP (ref 1.8–7.4)
NEUTROPHILS NFR BLD AUTO: 76.3 % — SIGNIFICANT CHANGE UP (ref 43–77)
NITRITE UR-MCNC: NEGATIVE — SIGNIFICANT CHANGE UP
NRBC # BLD: 0 /100 WBCS — SIGNIFICANT CHANGE UP (ref 0–0)
PH UR: 5.5 — SIGNIFICANT CHANGE UP (ref 5–8)
PLATELET # BLD AUTO: 284 K/UL — SIGNIFICANT CHANGE UP (ref 150–400)
POTASSIUM SERPL-MCNC: 3.8 MMOL/L — SIGNIFICANT CHANGE UP (ref 3.5–5.3)
POTASSIUM SERPL-SCNC: 3.8 MMOL/L — SIGNIFICANT CHANGE UP (ref 3.5–5.3)
PROT ?TM UR-MCNC: 8 MG/DL — SIGNIFICANT CHANGE UP (ref 0–12)
PROT SERPL-MCNC: 7 G/DL — SIGNIFICANT CHANGE UP (ref 6–8.3)
PROT UR-MCNC: NEGATIVE MG/DL — SIGNIFICANT CHANGE UP
PROT/CREAT UR-RTO: 0.1 RATIO — SIGNIFICANT CHANGE UP (ref 0–0.2)
PROTHROM AB SERPL-ACNC: 11.2 SEC — SIGNIFICANT CHANGE UP (ref 10.6–13.6)
RBC # BLD: 4.19 M/UL — SIGNIFICANT CHANGE UP (ref 3.8–5.2)
RBC # FLD: 13.5 % — SIGNIFICANT CHANGE UP (ref 10.3–14.5)
RH IG SCN BLD-IMP: POSITIVE — SIGNIFICANT CHANGE UP
RH IG SCN BLD-IMP: POSITIVE — SIGNIFICANT CHANGE UP
SARS-COV-2 IGG+IGM SERPL QL IA: >250 U/ML — HIGH
SARS-COV-2 IGG+IGM SERPL QL IA: POSITIVE
SODIUM SERPL-SCNC: 134 MMOL/L — LOW (ref 135–145)
SP GR SPEC: 1.01 — SIGNIFICANT CHANGE UP (ref 1–1.03)
T PALLIDUM AB TITR SER: NEGATIVE — SIGNIFICANT CHANGE UP
URATE SERPL-MCNC: 5.2 MG/DL — SIGNIFICANT CHANGE UP (ref 2.5–7)
UROBILINOGEN FLD QL: 0.2 E.U./DL — SIGNIFICANT CHANGE UP
WBC # BLD: 7.77 K/UL — SIGNIFICANT CHANGE UP (ref 3.8–10.5)
WBC # FLD AUTO: 7.77 K/UL — SIGNIFICANT CHANGE UP (ref 3.8–10.5)

## 2021-10-27 RX ORDER — LABETALOL HCL 100 MG
100 TABLET ORAL EVERY 24 HOURS
Refills: 0 | Status: DISCONTINUED | OUTPATIENT
Start: 2021-10-27 | End: 2021-10-31

## 2021-10-27 RX ORDER — LABETALOL HCL 100 MG
200 TABLET ORAL EVERY 24 HOURS
Refills: 0 | Status: DISCONTINUED | OUTPATIENT
Start: 2021-10-28 | End: 2021-10-31

## 2021-10-27 RX ORDER — SODIUM CHLORIDE 9 MG/ML
1000 INJECTION, SOLUTION INTRAVENOUS
Refills: 0 | Status: DISCONTINUED | OUTPATIENT
Start: 2021-10-27 | End: 2021-10-28

## 2021-10-27 RX ORDER — OXYTOCIN 10 UNIT/ML
333.33 VIAL (ML) INJECTION
Qty: 20 | Refills: 0 | Status: DISCONTINUED | OUTPATIENT
Start: 2021-10-27 | End: 2021-11-05

## 2021-10-27 RX ORDER — CITRIC ACID/SODIUM CITRATE 300-500 MG
15 SOLUTION, ORAL ORAL EVERY 6 HOURS
Refills: 0 | Status: DISCONTINUED | OUTPATIENT
Start: 2021-10-27 | End: 2021-10-28

## 2021-10-27 RX ORDER — DINOPROSTONE 10 MG/241MG
10 INSERT VAGINAL ONCE
Refills: 0 | Status: COMPLETED | OUTPATIENT
Start: 2021-10-27 | End: 2021-10-27

## 2021-10-27 RX ADMIN — Medication 100 MILLIGRAM(S): at 14:30

## 2021-10-27 RX ADMIN — SODIUM CHLORIDE 125 MILLILITER(S): 9 INJECTION, SOLUTION INTRAVENOUS at 12:11

## 2021-10-27 RX ADMIN — Medication 100 MILLIGRAM(S): at 20:20

## 2021-10-27 RX ADMIN — DINOPROSTONE 10 MILLIGRAM(S): 10 INSERT VAGINAL at 14:15

## 2021-10-27 NOTE — PATIENT PROFILE OB - PATIENT REPRESENTATIVE: ( YOU CAN CHOOSE ANY PERSON THAT CAN ASSIST YOU WITH YOUR HEALTH CARE PREFERENCES, DOES NOT HAVE TO BE A SPOUSE, IMMEDIATE FAMILY OR SIGNIFICANT OTHER/PARTNER)
"----- Message from Arelis Munson sent at 12/24/2020 12:01 PM EST -----  Regarding: Test Results Question  Contact: [phone]  Good day Dr. Eddy we hope you and your family had a very Merry Norm no matter what that looked like this year.  I had my work physical on 11- and thought you might need to see the lab results specifically due to the comment made regarding my \"lodine 500 MG\" prescription from you.  You can reference page 1, 3, and 4 on the attached PDF.    Please let me know if you have any concerns and or if we need to make any changes.  I have also sent these results to Dr. Ronni Kong and Nessa Gaston ([facility]).      Thank you,  Arelis Del Toro"Ankit\" Arpit  " Declines

## 2021-10-28 ENCOUNTER — RESULT REVIEW (OUTPATIENT)
Age: 36
End: 2021-10-28

## 2021-10-28 PROCEDURE — 88307 TISSUE EXAM BY PATHOLOGIST: CPT | Mod: 26

## 2021-10-28 PROCEDURE — 59510 CESAREAN DELIVERY: CPT | Mod: U9

## 2021-10-28 RX ORDER — ACETAMINOPHEN 500 MG
975 TABLET ORAL
Refills: 0 | Status: DISCONTINUED | OUTPATIENT
Start: 2021-10-28 | End: 2021-11-05

## 2021-10-28 RX ORDER — CITRIC ACID/SODIUM CITRATE 300-500 MG
30 SOLUTION, ORAL ORAL ONCE
Refills: 0 | Status: COMPLETED | OUTPATIENT
Start: 2021-10-28 | End: 2021-10-28

## 2021-10-28 RX ORDER — METOCLOPRAMIDE HCL 10 MG
10 TABLET ORAL ONCE
Refills: 0 | Status: COMPLETED | OUTPATIENT
Start: 2021-10-28 | End: 2021-10-28

## 2021-10-28 RX ORDER — LANOLIN
1 OINTMENT (GRAM) TOPICAL EVERY 6 HOURS
Refills: 0 | Status: DISCONTINUED | OUTPATIENT
Start: 2021-10-28 | End: 2021-11-05

## 2021-10-28 RX ORDER — IBUPROFEN 200 MG
600 TABLET ORAL EVERY 6 HOURS
Refills: 0 | Status: COMPLETED | OUTPATIENT
Start: 2021-10-28 | End: 2022-09-26

## 2021-10-28 RX ORDER — OXYCODONE HYDROCHLORIDE 5 MG/1
5 TABLET ORAL
Refills: 0 | Status: COMPLETED | OUTPATIENT
Start: 2021-10-28 | End: 2021-11-04

## 2021-10-28 RX ORDER — ENOXAPARIN SODIUM 100 MG/ML
40 INJECTION SUBCUTANEOUS EVERY 24 HOURS
Refills: 0 | Status: DISCONTINUED | OUTPATIENT
Start: 2021-10-29 | End: 2021-11-05

## 2021-10-28 RX ORDER — CEFAZOLIN SODIUM 1 G
2000 VIAL (EA) INJECTION ONCE
Refills: 0 | Status: COMPLETED | OUTPATIENT
Start: 2021-10-28 | End: 2021-10-28

## 2021-10-28 RX ORDER — SIMETHICONE 80 MG/1
80 TABLET, CHEWABLE ORAL EVERY 4 HOURS
Refills: 0 | Status: DISCONTINUED | OUTPATIENT
Start: 2021-10-28 | End: 2021-11-05

## 2021-10-28 RX ORDER — OXYTOCIN 10 UNIT/ML
333.33 VIAL (ML) INJECTION
Qty: 20 | Refills: 0 | Status: DISCONTINUED | OUTPATIENT
Start: 2021-10-28 | End: 2021-11-05

## 2021-10-28 RX ORDER — MAGNESIUM HYDROXIDE 400 MG/1
30 TABLET, CHEWABLE ORAL
Refills: 0 | Status: DISCONTINUED | OUTPATIENT
Start: 2021-10-28 | End: 2021-11-05

## 2021-10-28 RX ORDER — SODIUM CHLORIDE 9 MG/ML
1000 INJECTION, SOLUTION INTRAVENOUS
Refills: 0 | Status: DISCONTINUED | OUTPATIENT
Start: 2021-10-28 | End: 2021-11-01

## 2021-10-28 RX ORDER — CHLORHEXIDINE GLUCONATE 213 G/1000ML
1 SOLUTION TOPICAL DAILY
Refills: 0 | Status: DISCONTINUED | OUTPATIENT
Start: 2021-10-28 | End: 2021-10-28

## 2021-10-28 RX ORDER — TETANUS TOXOID, REDUCED DIPHTHERIA TOXOID AND ACELLULAR PERTUSSIS VACCINE, ADSORBED 5; 2.5; 8; 8; 2.5 [IU]/.5ML; [IU]/.5ML; UG/.5ML; UG/.5ML; UG/.5ML
0.5 SUSPENSION INTRAMUSCULAR ONCE
Refills: 0 | Status: DISCONTINUED | OUTPATIENT
Start: 2021-10-28 | End: 2021-11-05

## 2021-10-28 RX ORDER — DIPHENHYDRAMINE HCL 50 MG
25 CAPSULE ORAL EVERY 6 HOURS
Refills: 0 | Status: DISCONTINUED | OUTPATIENT
Start: 2021-10-28 | End: 2021-11-05

## 2021-10-28 RX ORDER — KETOROLAC TROMETHAMINE 30 MG/ML
30 SYRINGE (ML) INJECTION EVERY 6 HOURS
Refills: 0 | Status: DISCONTINUED | OUTPATIENT
Start: 2021-10-28 | End: 2021-10-30

## 2021-10-28 RX ORDER — OXYCODONE HYDROCHLORIDE 5 MG/1
5 TABLET ORAL ONCE
Refills: 0 | Status: DISCONTINUED | OUTPATIENT
Start: 2021-10-28 | End: 2021-11-04

## 2021-10-28 RX ADMIN — Medication 30 MILLIGRAM(S): at 19:56

## 2021-10-28 RX ADMIN — Medication 975 MILLIGRAM(S): at 21:33

## 2021-10-28 RX ADMIN — Medication 200 MILLIGRAM(S): at 08:42

## 2021-10-28 RX ADMIN — Medication 975 MILLIGRAM(S): at 21:15

## 2021-10-28 RX ADMIN — Medication 100 MILLIGRAM(S): at 17:09

## 2021-10-28 RX ADMIN — Medication 30 MILLILITER(S): at 17:14

## 2021-10-28 RX ADMIN — Medication 100 MILLIGRAM(S): at 21:33

## 2021-10-28 RX ADMIN — Medication 10 MILLIGRAM(S): at 23:26

## 2021-10-28 RX ADMIN — Medication 100 MILLIGRAM(S): at 13:56

## 2021-10-29 LAB
ALBUMIN SERPL ELPH-MCNC: 3.2 G/DL — LOW (ref 3.3–5)
ALP SERPL-CCNC: 69 U/L — SIGNIFICANT CHANGE UP (ref 40–120)
ALT FLD-CCNC: 10 U/L — SIGNIFICANT CHANGE UP (ref 10–45)
ANION GAP SERPL CALC-SCNC: 13 MMOL/L — SIGNIFICANT CHANGE UP (ref 5–17)
AST SERPL-CCNC: 21 U/L — SIGNIFICANT CHANGE UP (ref 10–40)
BILIRUB SERPL-MCNC: 0.3 MG/DL — SIGNIFICANT CHANGE UP (ref 0.2–1.2)
BUN SERPL-MCNC: 6 MG/DL — LOW (ref 7–23)
CALCIUM SERPL-MCNC: 9.5 MG/DL — SIGNIFICANT CHANGE UP (ref 8.4–10.5)
CHLORIDE SERPL-SCNC: 101 MMOL/L — SIGNIFICANT CHANGE UP (ref 96–108)
CO2 SERPL-SCNC: 22 MMOL/L — SIGNIFICANT CHANGE UP (ref 22–31)
CREAT SERPL-MCNC: 0.54 MG/DL — SIGNIFICANT CHANGE UP (ref 0.5–1.3)
GLUCOSE SERPL-MCNC: 128 MG/DL — HIGH (ref 70–99)
HCT VFR BLD CALC: 33.8 % — LOW (ref 34.5–45)
HGB BLD-MCNC: 11.6 G/DL — SIGNIFICANT CHANGE UP (ref 11.5–15.5)
MCHC RBC-ENTMCNC: 30.4 PG — SIGNIFICANT CHANGE UP (ref 27–34)
MCHC RBC-ENTMCNC: 34.3 GM/DL — SIGNIFICANT CHANGE UP (ref 32–36)
MCV RBC AUTO: 88.5 FL — SIGNIFICANT CHANGE UP (ref 80–100)
NRBC # BLD: 0 /100 WBCS — SIGNIFICANT CHANGE UP (ref 0–0)
PLATELET # BLD AUTO: 287 K/UL — SIGNIFICANT CHANGE UP (ref 150–400)
POTASSIUM SERPL-MCNC: 4.4 MMOL/L — SIGNIFICANT CHANGE UP (ref 3.5–5.3)
POTASSIUM SERPL-SCNC: 4.4 MMOL/L — SIGNIFICANT CHANGE UP (ref 3.5–5.3)
PROT SERPL-MCNC: 6.4 G/DL — SIGNIFICANT CHANGE UP (ref 6–8.3)
RBC # BLD: 3.82 M/UL — SIGNIFICANT CHANGE UP (ref 3.8–5.2)
RBC # FLD: 13.2 % — SIGNIFICANT CHANGE UP (ref 10.3–14.5)
SODIUM SERPL-SCNC: 136 MMOL/L — SIGNIFICANT CHANGE UP (ref 135–145)
T PALLIDUM AB TITR SER: NEGATIVE — SIGNIFICANT CHANGE UP
WBC # BLD: 21.89 K/UL — HIGH (ref 3.8–10.5)
WBC # FLD AUTO: 21.89 K/UL — HIGH (ref 3.8–10.5)

## 2021-10-29 RX ORDER — OXYCODONE HYDROCHLORIDE 5 MG/1
5 TABLET ORAL
Refills: 0 | Status: DISCONTINUED | OUTPATIENT
Start: 2021-10-29 | End: 2021-11-05

## 2021-10-29 RX ADMIN — Medication 100 MILLIGRAM(S): at 22:38

## 2021-10-29 RX ADMIN — Medication 30 MILLIGRAM(S): at 06:30

## 2021-10-29 RX ADMIN — Medication 975 MILLIGRAM(S): at 09:50

## 2021-10-29 RX ADMIN — ENOXAPARIN SODIUM 40 MILLIGRAM(S): 100 INJECTION SUBCUTANEOUS at 06:30

## 2021-10-29 RX ADMIN — SIMETHICONE 80 MILLIGRAM(S): 80 TABLET, CHEWABLE ORAL at 12:20

## 2021-10-29 RX ADMIN — Medication 975 MILLIGRAM(S): at 15:47

## 2021-10-29 RX ADMIN — Medication 975 MILLIGRAM(S): at 16:20

## 2021-10-29 RX ADMIN — Medication 30 MILLIGRAM(S): at 19:00

## 2021-10-29 RX ADMIN — Medication 975 MILLIGRAM(S): at 10:28

## 2021-10-29 RX ADMIN — Medication 975 MILLIGRAM(S): at 02:54

## 2021-10-29 RX ADMIN — Medication 200 MILLIGRAM(S): at 07:00

## 2021-10-29 RX ADMIN — Medication 30 MILLIGRAM(S): at 18:30

## 2021-10-29 RX ADMIN — Medication 30 MILLIGRAM(S): at 06:45

## 2021-10-29 RX ADMIN — Medication 975 MILLIGRAM(S): at 03:54

## 2021-10-29 RX ADMIN — Medication 100 MILLIGRAM(S): at 14:12

## 2021-10-29 RX ADMIN — Medication 975 MILLIGRAM(S): at 21:55

## 2021-10-29 RX ADMIN — Medication 30 MILLIGRAM(S): at 13:00

## 2021-10-29 RX ADMIN — Medication 30 MILLIGRAM(S): at 12:20

## 2021-10-29 RX ADMIN — Medication 975 MILLIGRAM(S): at 22:00

## 2021-10-29 NOTE — LACTATION INITIAL EVALUATION - LACTATION INTERVENTIONS
initiate/review safe skin-to-skin/initiate/review hand expression/initiate/review pumping guidelines and safe milk handling/initiate/review techniques for position and latch/post discharge community resources provided/review techniques to increase milk supply/review techniques to manage sore nipples/engorgement/initiate/review finger suck/initiate/review breast massage/compression/reviewed components of an effective feeding and at least 8 effective feedings per day required/reviewed importance of monitoring infant diapers, the breastfeeding log, and minimum output each day/reviewed risks of unnecessary formula supplementation/reviewed risks of artificial nipples/reviewed benefits and recommendations for rooming in/reviewed feeding on demand/by cue at least 8 times a day/reviewed indications of inadequate milk transfer that would require supplementation

## 2021-10-29 NOTE — PROGRESS NOTE ADULT - ASSESSMENT
A/P: 36y POD1 s/p primary  section for failed IOL. Pt is hemodynamically stable.   -  Neuro-Pain control with motrin/acetaminophen, oxycodone for severe pain PRN  -  Cardio: no issues. Continue to monitor routinely   -  GI: tolerating CLD, Reglan/Zofran prn   -  : pressley to be removed this AM, f/u TOV  -  DVT prophylaxis: Lovenox 40mg qd  -  Activity- ambulate as tolerated   -  Heme: f/u post-op hemoglobin

## 2021-10-29 NOTE — LACTATION INITIAL EVALUATION - NS LACT CON REASON FOR REQ
primaparous mom/staff request/patient request Consult initiated on  PCS  Mother with male infant GA 38.4, seen at 24 hours of birth. Mother’s feeding plan is to breastfeed. Parent education on breastfeeding basics, benefits of exclusive breastfeeding, frequency of feeds 8-12 in 24 hours, observing for feeding cues, risks of pacifier use and monitoring infants daily output and length of feeds. S2S was encouraged to wake infant for feedings.  Infant was circumcised today and has been very sleepy and difficult to arouse.  Assisted mother with siting position and infant in clutch hold and after a few attempts infant latched and remained at breast for 15 minutes. Infant noted to have wide gape and Mother denied any pain at the breast and confirmed pulling. Mother was able to sustain positioning and alignment. Instructed on breast massage and hand expression to manage milk production and discussed benefits of colostrum.  Mother was encouraged to ask for lactation assistance as needed from RN or if latch difficulties she could request LC follow up. All questions concerns were addressed and mother verbalized and demonstrated understanding./primaparous mom/staff request/patient request

## 2021-10-30 RX ORDER — IBUPROFEN 200 MG
600 TABLET ORAL EVERY 6 HOURS
Refills: 0 | Status: DISCONTINUED | OUTPATIENT
Start: 2021-10-30 | End: 2021-11-05

## 2021-10-30 RX ADMIN — Medication 30 MILLIGRAM(S): at 00:00

## 2021-10-30 RX ADMIN — Medication 30 MILLIGRAM(S): at 06:18

## 2021-10-30 RX ADMIN — Medication 975 MILLIGRAM(S): at 03:00

## 2021-10-30 RX ADMIN — Medication 975 MILLIGRAM(S): at 04:00

## 2021-10-30 RX ADMIN — Medication 30 MILLIGRAM(S): at 07:15

## 2021-10-30 RX ADMIN — Medication 975 MILLIGRAM(S): at 21:00

## 2021-10-30 RX ADMIN — SIMETHICONE 80 MILLIGRAM(S): 80 TABLET, CHEWABLE ORAL at 09:24

## 2021-10-30 RX ADMIN — MAGNESIUM HYDROXIDE 30 MILLILITER(S): 400 TABLET, CHEWABLE ORAL at 18:19

## 2021-10-30 RX ADMIN — Medication 975 MILLIGRAM(S): at 15:40

## 2021-10-30 RX ADMIN — Medication 975 MILLIGRAM(S): at 15:06

## 2021-10-30 RX ADMIN — ENOXAPARIN SODIUM 40 MILLIGRAM(S): 100 INJECTION SUBCUTANEOUS at 06:18

## 2021-10-30 RX ADMIN — Medication 100 MILLIGRAM(S): at 22:16

## 2021-10-30 RX ADMIN — Medication 1 APPLICATION(S): at 09:24

## 2021-10-30 RX ADMIN — Medication 975 MILLIGRAM(S): at 22:00

## 2021-10-30 RX ADMIN — Medication 200 MILLIGRAM(S): at 06:44

## 2021-10-30 RX ADMIN — Medication 600 MILLIGRAM(S): at 18:19

## 2021-10-30 RX ADMIN — Medication 975 MILLIGRAM(S): at 10:00

## 2021-10-30 RX ADMIN — Medication 600 MILLIGRAM(S): at 12:35

## 2021-10-30 RX ADMIN — Medication 600 MILLIGRAM(S): at 18:50

## 2021-10-30 RX ADMIN — Medication 600 MILLIGRAM(S): at 13:10

## 2021-10-30 RX ADMIN — Medication 975 MILLIGRAM(S): at 09:25

## 2021-10-30 RX ADMIN — Medication 100 MILLIGRAM(S): at 14:44

## 2021-10-30 RX ADMIN — Medication 30 MILLIGRAM(S): at 01:00

## 2021-10-30 NOTE — PROGRESS NOTE ADULT - ASSESSMENT
A/P: 36y s/p  section, POD #2 stable  -  Pain: PO motrin/acetaminophen, oxycodone for severe pain PRN  -  Post-operatively labs: post-op Hgb stable  -  GI: tolerating regular diet, passing gas  -  : voiding spontaneously  -  DVT prophylaxis: ambulation, SCDs, Lovenox  -  Dispo: POD 3 or 4

## 2021-10-31 LAB
ALBUMIN SERPL ELPH-MCNC: 3.1 G/DL — LOW (ref 3.3–5)
ALBUMIN SERPL ELPH-MCNC: 3.7 G/DL — SIGNIFICANT CHANGE UP (ref 3.3–5)
ALP SERPL-CCNC: 59 U/L — SIGNIFICANT CHANGE UP (ref 40–120)
ALP SERPL-CCNC: 65 U/L — SIGNIFICANT CHANGE UP (ref 40–120)
ALT FLD-CCNC: 10 U/L — SIGNIFICANT CHANGE UP (ref 10–45)
ALT FLD-CCNC: 14 U/L — SIGNIFICANT CHANGE UP (ref 10–45)
ANION GAP SERPL CALC-SCNC: 10 MMOL/L — SIGNIFICANT CHANGE UP (ref 5–17)
ANION GAP SERPL CALC-SCNC: 8 MMOL/L — SIGNIFICANT CHANGE UP (ref 5–17)
APPEARANCE UR: CLEAR — SIGNIFICANT CHANGE UP
APTT BLD: 29.8 SEC — SIGNIFICANT CHANGE UP (ref 27.5–35.5)
AST SERPL-CCNC: 18 U/L — SIGNIFICANT CHANGE UP (ref 10–40)
AST SERPL-CCNC: 23 U/L — SIGNIFICANT CHANGE UP (ref 10–40)
BACTERIA # UR AUTO: PRESENT /HPF
BASOPHILS # BLD AUTO: 0.04 K/UL — SIGNIFICANT CHANGE UP (ref 0–0.2)
BASOPHILS NFR BLD AUTO: 0.4 % — SIGNIFICANT CHANGE UP (ref 0–2)
BILIRUB SERPL-MCNC: 0.2 MG/DL — SIGNIFICANT CHANGE UP (ref 0.2–1.2)
BILIRUB SERPL-MCNC: <0.2 MG/DL — SIGNIFICANT CHANGE UP (ref 0.2–1.2)
BILIRUB UR-MCNC: NEGATIVE — SIGNIFICANT CHANGE UP
BUN SERPL-MCNC: 6 MG/DL — LOW (ref 7–23)
BUN SERPL-MCNC: 7 MG/DL — SIGNIFICANT CHANGE UP (ref 7–23)
CALCIUM SERPL-MCNC: 8.9 MG/DL — SIGNIFICANT CHANGE UP (ref 8.4–10.5)
CALCIUM SERPL-MCNC: 9.4 MG/DL — SIGNIFICANT CHANGE UP (ref 8.4–10.5)
CHLORIDE SERPL-SCNC: 108 MMOL/L — SIGNIFICANT CHANGE UP (ref 96–108)
CHLORIDE SERPL-SCNC: 108 MMOL/L — SIGNIFICANT CHANGE UP (ref 96–108)
CO2 SERPL-SCNC: 23 MMOL/L — SIGNIFICANT CHANGE UP (ref 22–31)
CO2 SERPL-SCNC: 25 MMOL/L — SIGNIFICANT CHANGE UP (ref 22–31)
COLOR SPEC: YELLOW — SIGNIFICANT CHANGE UP
CREAT ?TM UR-MCNC: 15 MG/DL — SIGNIFICANT CHANGE UP
CREAT SERPL-MCNC: 0.54 MG/DL — SIGNIFICANT CHANGE UP (ref 0.5–1.3)
CREAT SERPL-MCNC: 0.64 MG/DL — SIGNIFICANT CHANGE UP (ref 0.5–1.3)
DIFF PNL FLD: ABNORMAL
EOSINOPHIL # BLD AUTO: 0.16 K/UL — SIGNIFICANT CHANGE UP (ref 0–0.5)
EOSINOPHIL NFR BLD AUTO: 1.4 % — SIGNIFICANT CHANGE UP (ref 0–6)
EPI CELLS # UR: SIGNIFICANT CHANGE UP /HPF (ref 0–5)
FIBRINOGEN PPP-MCNC: 584 MG/DL — HIGH (ref 258–438)
GLUCOSE SERPL-MCNC: 95 MG/DL — SIGNIFICANT CHANGE UP (ref 70–99)
GLUCOSE SERPL-MCNC: 96 MG/DL — SIGNIFICANT CHANGE UP (ref 70–99)
GLUCOSE UR QL: NEGATIVE — SIGNIFICANT CHANGE UP
HCT VFR BLD CALC: 29.7 % — LOW (ref 34.5–45)
HCT VFR BLD CALC: 32.6 % — LOW (ref 34.5–45)
HGB BLD-MCNC: 11.1 G/DL — LOW (ref 11.5–15.5)
HGB BLD-MCNC: 9.7 G/DL — LOW (ref 11.5–15.5)
IMM GRANULOCYTES NFR BLD AUTO: 0.4 % — SIGNIFICANT CHANGE UP (ref 0–1.5)
INR BLD: 0.87 — LOW (ref 0.88–1.16)
KETONES UR-MCNC: NEGATIVE — SIGNIFICANT CHANGE UP
LDH SERPL L TO P-CCNC: 215 U/L — SIGNIFICANT CHANGE UP (ref 50–242)
LEUKOCYTE ESTERASE UR-ACNC: NEGATIVE — SIGNIFICANT CHANGE UP
LYMPHOCYTES # BLD AUTO: 1.77 K/UL — SIGNIFICANT CHANGE UP (ref 1–3.3)
LYMPHOCYTES # BLD AUTO: 15.5 % — SIGNIFICANT CHANGE UP (ref 13–44)
MCHC RBC-ENTMCNC: 29.9 PG — SIGNIFICANT CHANGE UP (ref 27–34)
MCHC RBC-ENTMCNC: 30.6 PG — SIGNIFICANT CHANGE UP (ref 27–34)
MCHC RBC-ENTMCNC: 32.7 GM/DL — SIGNIFICANT CHANGE UP (ref 32–36)
MCHC RBC-ENTMCNC: 34 GM/DL — SIGNIFICANT CHANGE UP (ref 32–36)
MCV RBC AUTO: 89.8 FL — SIGNIFICANT CHANGE UP (ref 80–100)
MCV RBC AUTO: 91.7 FL — SIGNIFICANT CHANGE UP (ref 80–100)
MONOCYTES # BLD AUTO: 0.62 K/UL — SIGNIFICANT CHANGE UP (ref 0–0.9)
MONOCYTES NFR BLD AUTO: 5.4 % — SIGNIFICANT CHANGE UP (ref 2–14)
NEUTROPHILS # BLD AUTO: 8.76 K/UL — HIGH (ref 1.8–7.4)
NEUTROPHILS NFR BLD AUTO: 76.9 % — SIGNIFICANT CHANGE UP (ref 43–77)
NITRITE UR-MCNC: NEGATIVE — SIGNIFICANT CHANGE UP
NRBC # BLD: 0 /100 WBCS — SIGNIFICANT CHANGE UP (ref 0–0)
NRBC # BLD: 0 /100 WBCS — SIGNIFICANT CHANGE UP (ref 0–0)
PH UR: 8 — SIGNIFICANT CHANGE UP (ref 5–8)
PLATELET # BLD AUTO: 274 K/UL — SIGNIFICANT CHANGE UP (ref 150–400)
PLATELET # BLD AUTO: 307 K/UL — SIGNIFICANT CHANGE UP (ref 150–400)
POTASSIUM SERPL-MCNC: 3.9 MMOL/L — SIGNIFICANT CHANGE UP (ref 3.5–5.3)
POTASSIUM SERPL-MCNC: 4 MMOL/L — SIGNIFICANT CHANGE UP (ref 3.5–5.3)
POTASSIUM SERPL-SCNC: 3.9 MMOL/L — SIGNIFICANT CHANGE UP (ref 3.5–5.3)
POTASSIUM SERPL-SCNC: 4 MMOL/L — SIGNIFICANT CHANGE UP (ref 3.5–5.3)
PROT ?TM UR-MCNC: <4 MG/DL — SIGNIFICANT CHANGE UP (ref 0–12)
PROT ?TM UR-MCNC: <4 MG/DL — SIGNIFICANT CHANGE UP (ref 0–12)
PROT SERPL-MCNC: 6.1 G/DL — SIGNIFICANT CHANGE UP (ref 6–8.3)
PROT SERPL-MCNC: 7.1 G/DL — SIGNIFICANT CHANGE UP (ref 6–8.3)
PROT UR-MCNC: NEGATIVE MG/DL — SIGNIFICANT CHANGE UP
PROT/CREAT UR-RTO: SIGNIFICANT CHANGE UP (ref 0–0.2)
PROTHROM AB SERPL-ACNC: 10.5 SEC — LOW (ref 10.6–13.6)
RBC # BLD: 3.24 M/UL — LOW (ref 3.8–5.2)
RBC # BLD: 3.63 M/UL — LOW (ref 3.8–5.2)
RBC # FLD: 13.7 % — SIGNIFICANT CHANGE UP (ref 10.3–14.5)
RBC # FLD: 13.8 % — SIGNIFICANT CHANGE UP (ref 10.3–14.5)
RBC CASTS # UR COMP ASSIST: ABNORMAL /HPF
SODIUM SERPL-SCNC: 141 MMOL/L — SIGNIFICANT CHANGE UP (ref 135–145)
SODIUM SERPL-SCNC: 141 MMOL/L — SIGNIFICANT CHANGE UP (ref 135–145)
SP GR SPEC: 1.01 — SIGNIFICANT CHANGE UP (ref 1–1.03)
URATE SERPL-MCNC: 5.1 MG/DL — SIGNIFICANT CHANGE UP (ref 2.5–7)
UROBILINOGEN FLD QL: 0.2 E.U./DL — SIGNIFICANT CHANGE UP
WBC # BLD: 11.01 K/UL — HIGH (ref 3.8–10.5)
WBC # BLD: 11.39 K/UL — HIGH (ref 3.8–10.5)
WBC # FLD AUTO: 11.01 K/UL — HIGH (ref 3.8–10.5)
WBC # FLD AUTO: 11.39 K/UL — HIGH (ref 3.8–10.5)
WBC UR QL: < 5 /HPF — SIGNIFICANT CHANGE UP

## 2021-10-31 RX ORDER — LABETALOL HCL 100 MG
300 TABLET ORAL EVERY 8 HOURS
Refills: 0 | Status: DISCONTINUED | OUTPATIENT
Start: 2021-10-31 | End: 2021-11-01

## 2021-10-31 RX ORDER — NIFEDIPINE 30 MG
30 TABLET, EXTENDED RELEASE 24 HR ORAL EVERY 24 HOURS
Refills: 0 | Status: DISCONTINUED | OUTPATIENT
Start: 2021-10-31 | End: 2021-11-01

## 2021-10-31 RX ORDER — LABETALOL HCL 100 MG
200 TABLET ORAL THREE TIMES A DAY
Refills: 0 | Status: DISCONTINUED | OUTPATIENT
Start: 2021-10-31 | End: 2021-10-31

## 2021-10-31 RX ORDER — NIFEDIPINE 30 MG
10 TABLET, EXTENDED RELEASE 24 HR ORAL ONCE
Refills: 0 | Status: COMPLETED | OUTPATIENT
Start: 2021-10-31 | End: 2021-10-31

## 2021-10-31 RX ADMIN — Medication 300 MILLIGRAM(S): at 22:19

## 2021-10-31 RX ADMIN — Medication 600 MILLIGRAM(S): at 23:52

## 2021-10-31 RX ADMIN — Medication 975 MILLIGRAM(S): at 10:45

## 2021-10-31 RX ADMIN — Medication 600 MILLIGRAM(S): at 01:00

## 2021-10-31 RX ADMIN — Medication 975 MILLIGRAM(S): at 03:00

## 2021-10-31 RX ADMIN — Medication 10 MILLIGRAM(S): at 11:50

## 2021-10-31 RX ADMIN — Medication 975 MILLIGRAM(S): at 21:25

## 2021-10-31 RX ADMIN — Medication 200 MILLIGRAM(S): at 06:00

## 2021-10-31 RX ADMIN — Medication 975 MILLIGRAM(S): at 16:35

## 2021-10-31 RX ADMIN — Medication 30 MILLIGRAM(S): at 23:02

## 2021-10-31 RX ADMIN — Medication 975 MILLIGRAM(S): at 15:30

## 2021-10-31 RX ADMIN — Medication 600 MILLIGRAM(S): at 07:00

## 2021-10-31 RX ADMIN — Medication 200 MILLIGRAM(S): at 11:02

## 2021-10-31 RX ADMIN — Medication 600 MILLIGRAM(S): at 00:00

## 2021-10-31 RX ADMIN — Medication 10 MILLIGRAM(S): at 13:42

## 2021-10-31 RX ADMIN — Medication 975 MILLIGRAM(S): at 10:00

## 2021-10-31 RX ADMIN — Medication 600 MILLIGRAM(S): at 13:42

## 2021-10-31 RX ADMIN — Medication 300 MILLIGRAM(S): at 13:42

## 2021-10-31 RX ADMIN — Medication 975 MILLIGRAM(S): at 21:59

## 2021-10-31 RX ADMIN — Medication 600 MILLIGRAM(S): at 06:00

## 2021-10-31 RX ADMIN — Medication 975 MILLIGRAM(S): at 04:00

## 2021-10-31 RX ADMIN — ENOXAPARIN SODIUM 40 MILLIGRAM(S): 100 INJECTION SUBCUTANEOUS at 07:33

## 2021-10-31 RX ADMIN — Medication 600 MILLIGRAM(S): at 14:15

## 2021-10-31 NOTE — CHART NOTE - NSCHARTNOTEFT_GEN_A_CORE
I seen the patient at bedside for repeated severe range BP, most recently 166/109.   The patient has known cHTN, home meds were labetalol 200mg-morning, 100 mg noon, and 100mg at bedtime. Earlier today Labetalol increased for 200 TID, the patient received the noon dose early due to elevated BPs, around 10:30.   The patient continue to have BPs in the 160s.  The patient is asymptomatic and denies HA, visual changes, CP, RUQ/epigastric pain or SOB.     PE:   Gen: NAD, A+O*3  CV: RRR, no murmurs  Lungs: CTAB  Abdomen: soft  Extremities: b/l edema +2    The patient received PO 10 mg nifedipine and additional Labetalol-300 mg and regular med increased for 300 TID.   IV access was obtained.   BP will be repeated 30 min.   Full labs were drawn.   Attending is aware and in agreement, per attending the elevated BPs are most likely due to her chronic HTN and no IV magnesium for now.     - Continue monitor BPs  - f/u full labs.     D/w Dr. Lisa Huntley

## 2021-10-31 NOTE — CHART NOTE - NSCHARTNOTEFT_GEN_A_CORE
The patient had a few severe range BP ON and today, attending was contacted and decision is to increase Labetalol from 200, 100,100 to 200 TID.   The patient is asymptomatic and denies any toxic symptoms.

## 2021-10-31 NOTE — CHART NOTE - NSCHARTNOTEFT_GEN_A_CORE
Reviewed BP of the day with attending Dr. Montelongo.  Patient remains to have no toxic sx, repeat labs stable.  Increased labetalol from 200/100/100 to 200 TID as well as administered oral nifedipine 10 IR.  BP now 167 systolic.  Discussed starting IV magnesium v increasing medications further.  Plan to repeat BP in 15 min to determine course of action.

## 2021-10-31 NOTE — PROGRESS NOTE ADULT - ASSESSMENT
A/P: 36y POD3 s/p  section c/b cHTN. Pt is hemodynamically stable.   - cHTN: one severe range BP overnight, otherwise normal to mild range BP. No toxic complaints. Cont home labetalol regimen  -  Neuro-Pain control with motrin/acetaminophen, oxycodone for severe pain PRN  -  Cardio: no issues. Continue to monitor routinely   -  GI: Reg diet, Reglan/Zofran prn   -  : s/p pressley, voiding spontaneously  -  DVT prophylaxis: Lovenox 40mg qd  -  Activity- ambulated as tolerated   -  Heme: post-op hemoglobin stable, f/u AM full labs

## 2021-11-01 LAB
ALBUMIN SERPL ELPH-MCNC: 3.4 G/DL — SIGNIFICANT CHANGE UP (ref 3.3–5)
ALP SERPL-CCNC: 64 U/L — SIGNIFICANT CHANGE UP (ref 40–120)
ALT FLD-CCNC: 34 U/L — SIGNIFICANT CHANGE UP (ref 10–45)
ANION GAP SERPL CALC-SCNC: 11 MMOL/L — SIGNIFICANT CHANGE UP (ref 5–17)
AST SERPL-CCNC: 41 U/L — HIGH (ref 10–40)
BILIRUB SERPL-MCNC: 0.2 MG/DL — SIGNIFICANT CHANGE UP (ref 0.2–1.2)
BUN SERPL-MCNC: 7 MG/DL — SIGNIFICANT CHANGE UP (ref 7–23)
CALCIUM SERPL-MCNC: 9.6 MG/DL — SIGNIFICANT CHANGE UP (ref 8.4–10.5)
CHLORIDE SERPL-SCNC: 107 MMOL/L — SIGNIFICANT CHANGE UP (ref 96–108)
CO2 SERPL-SCNC: 21 MMOL/L — LOW (ref 22–31)
CREAT SERPL-MCNC: 0.57 MG/DL — SIGNIFICANT CHANGE UP (ref 0.5–1.3)
GLUCOSE SERPL-MCNC: 94 MG/DL — SIGNIFICANT CHANGE UP (ref 70–99)
HCT VFR BLD CALC: 33.1 % — LOW (ref 34.5–45)
HGB BLD-MCNC: 11 G/DL — LOW (ref 11.5–15.5)
MAGNESIUM SERPL-MCNC: 4.3 MG/DL — HIGH (ref 1.6–2.6)
MCHC RBC-ENTMCNC: 30.5 PG — SIGNIFICANT CHANGE UP (ref 27–34)
MCHC RBC-ENTMCNC: 33.2 GM/DL — SIGNIFICANT CHANGE UP (ref 32–36)
MCV RBC AUTO: 91.7 FL — SIGNIFICANT CHANGE UP (ref 80–100)
NRBC # BLD: 0 /100 WBCS — SIGNIFICANT CHANGE UP (ref 0–0)
PLATELET # BLD AUTO: 325 K/UL — SIGNIFICANT CHANGE UP (ref 150–400)
POTASSIUM SERPL-MCNC: 3.9 MMOL/L — SIGNIFICANT CHANGE UP (ref 3.5–5.3)
POTASSIUM SERPL-SCNC: 3.9 MMOL/L — SIGNIFICANT CHANGE UP (ref 3.5–5.3)
PROT SERPL-MCNC: 6.9 G/DL — SIGNIFICANT CHANGE UP (ref 6–8.3)
RBC # BLD: 3.61 M/UL — LOW (ref 3.8–5.2)
RBC # FLD: 13.5 % — SIGNIFICANT CHANGE UP (ref 10.3–14.5)
SODIUM SERPL-SCNC: 139 MMOL/L — SIGNIFICANT CHANGE UP (ref 135–145)
WBC # BLD: 9.63 K/UL — SIGNIFICANT CHANGE UP (ref 3.8–10.5)
WBC # FLD AUTO: 9.63 K/UL — SIGNIFICANT CHANGE UP (ref 3.8–10.5)

## 2021-11-01 RX ORDER — HYDRALAZINE HCL 50 MG
10 TABLET ORAL ONCE
Refills: 0 | Status: COMPLETED | OUTPATIENT
Start: 2021-11-01 | End: 2021-11-01

## 2021-11-01 RX ORDER — LABETALOL HCL 100 MG
300 TABLET ORAL EVERY 8 HOURS
Refills: 0 | Status: DISCONTINUED | OUTPATIENT
Start: 2021-11-01 | End: 2021-11-02

## 2021-11-01 RX ORDER — MAGNESIUM SULFATE 500 MG/ML
4 VIAL (ML) INJECTION ONCE
Refills: 0 | Status: COMPLETED | OUTPATIENT
Start: 2021-11-01 | End: 2021-11-01

## 2021-11-01 RX ORDER — HYDRALAZINE HCL 50 MG
10 TABLET ORAL ONCE
Refills: 0 | Status: COMPLETED | OUTPATIENT
Start: 2021-11-01 | End: 2021-10-31

## 2021-11-01 RX ORDER — NIFEDIPINE 30 MG
60 TABLET, EXTENDED RELEASE 24 HR ORAL EVERY 24 HOURS
Refills: 0 | Status: DISCONTINUED | OUTPATIENT
Start: 2021-11-01 | End: 2021-11-02

## 2021-11-01 RX ORDER — SODIUM CHLORIDE 9 MG/ML
1000 INJECTION, SOLUTION INTRAVENOUS
Refills: 0 | Status: DISCONTINUED | OUTPATIENT
Start: 2021-11-01 | End: 2021-11-05

## 2021-11-01 RX ORDER — MAGNESIUM SULFATE 500 MG/ML
2 VIAL (ML) INJECTION
Qty: 40 | Refills: 0 | Status: DISCONTINUED | OUTPATIENT
Start: 2021-11-01 | End: 2021-11-02

## 2021-11-01 RX ADMIN — Medication 600 MILLIGRAM(S): at 06:57

## 2021-11-01 RX ADMIN — Medication 600 MILLIGRAM(S): at 17:30

## 2021-11-01 RX ADMIN — Medication 600 MILLIGRAM(S): at 12:30

## 2021-11-01 RX ADMIN — ENOXAPARIN SODIUM 40 MILLIGRAM(S): 100 INJECTION SUBCUTANEOUS at 06:16

## 2021-11-01 RX ADMIN — Medication 600 MILLIGRAM(S): at 06:16

## 2021-11-01 RX ADMIN — Medication 975 MILLIGRAM(S): at 14:32

## 2021-11-01 RX ADMIN — Medication 600 MILLIGRAM(S): at 00:38

## 2021-11-01 RX ADMIN — Medication 975 MILLIGRAM(S): at 09:14

## 2021-11-01 RX ADMIN — Medication 975 MILLIGRAM(S): at 10:15

## 2021-11-01 RX ADMIN — Medication 975 MILLIGRAM(S): at 21:34

## 2021-11-01 RX ADMIN — Medication 300 MILLIGRAM(S): at 06:20

## 2021-11-01 RX ADMIN — Medication 10 MILLIGRAM(S): at 00:59

## 2021-11-01 RX ADMIN — Medication 60 MILLIGRAM(S): at 23:35

## 2021-11-01 RX ADMIN — Medication 975 MILLIGRAM(S): at 03:03

## 2021-11-01 RX ADMIN — Medication 300 MILLIGRAM(S): at 21:35

## 2021-11-01 RX ADMIN — Medication 50 GM/HR: at 14:28

## 2021-11-01 RX ADMIN — Medication 300 MILLIGRAM(S): at 14:31

## 2021-11-01 RX ADMIN — Medication 300 GRAM(S): at 13:57

## 2021-11-01 RX ADMIN — Medication 600 MILLIGRAM(S): at 23:35

## 2021-11-01 RX ADMIN — Medication 975 MILLIGRAM(S): at 03:44

## 2021-11-01 RX ADMIN — Medication 975 MILLIGRAM(S): at 22:20

## 2021-11-01 RX ADMIN — Medication 975 MILLIGRAM(S): at 15:20

## 2021-11-01 NOTE — CHART NOTE - NSCHARTNOTEFT_GEN_A_CORE
Notified by RN that pt reports waking up with a headache. Pt seen and evaluated at bedside. States that the headache improved after tylenol which was given at 0900. She is currently eating breakfast and states that her headache is improving further, currently 3-4 out of 10. She says that she is very prone to headaches so feels that this is not new for her. She denies any vision changes/scotoma, chest pain, palpitations, shortness of breath, RUQ/epigastric pain, or worsening edema.     BP taken while at bedside 154/96 HR 85.  Gen: sitting in chair eating breakfast  CV: RR  Pulm: CTA b/l  Abd: gravid, nontender   Ext: mild LE edema to mid-calves b/l, 2+ upper extremity reflexes b/l, no ankle clonus    A/P: 37 y/o  POD4 from pCS for failed IOL c/b cHTN  - Due to fact that pt headache is improving at this time, will hold off on IV magnesium  - Cont PO labetalol 300mg TID and nifedipine 30mg XR  - VSq4h  - D/w Dr. Rizo, PGY3  - D/w Dr. Mcdonnell, [facility] Attending Notified by RN that pt reports waking up with a headache. Pt seen and evaluated at bedside. States that the headache improved after tylenol which was given at 0900. She is currently eating breakfast and states that her headache is improving further, currently 3-4 out of 10. She says that she is very prone to headaches so feels that this is not new for her. She denies any vision changes/scotoma, chest pain, palpitations, shortness of breath, RUQ/epigastric pain, or worsening edema.     BP taken while at bedside 154/96 HR 85.  Gen: sitting in chair eating breakfast  CV: RR  Pulm: CTA b/l  Abd: gravid, nontender   Ext: mild LE edema to mid-calves b/l, 2+ upper extremity reflexes b/l, no ankle clonus    A/P: 35 y/o  POD4 from pCS for failed IOL c/b cHTN  - Due to fact that pt headache is improving, will hold on Iv Magnesium pending attending evaluation   - Cont PO labetalol 300mg TID and nifedipine 30mg XR  - VSq4h  - D/w Dr. Rizo, PGY3  - D/w Dr. Mcdonnell, OBGYN Attending                ATTENDING ATTESTATION:   Patient evaluated at bedside for complaint of persistent headache. Patient reports that although she is prone to headache, this one feels different. It is improved from this morning but continues to linger despite Tylenol, resting and eating. She denies vision changes, is ambulating without issue, voiding spontaneously, passing gas and tolerating regular diet. She denies fevers, chills, SOB, sore throat, cough, abdominal pain or any other complaints.     Vitals reviewed: blood pressures have fluctuated but are now improved on current antihypertensive regimen. Given persistent HA, will start IV Magnesium in addition to current regimen of antihypertensives. Continue with routine postoperative care.     Patient is in agreement with the plan and has no additional needs or questions at this time.     MARVEL Mcdonnell MD

## 2021-11-01 NOTE — PROGRESS NOTE ADULT - ASSESSMENT
A/P: 36y POD4 s/p primary  section for failed IOL c/b cHTN. Pt is hemodynamically stable.   - cHTN: cont labetalol 300mg TID and nifedipine 30mg QD, VSq4, f/u AM labs  -  Neuro-Pain control with motrin/acetaminophen, oxycodone for severe pain PRN  -  Cardio: no issues. Continue to monitor routinely   -  GI: Reg diet  -  : voiding spontaneously  -  DVT prophylaxis: Lovenox 40mg qd  -  Activity- ambulate as tolerated   -  Heme: post-op hemoglobin stable

## 2021-11-01 NOTE — CHART NOTE - NSCHARTNOTEFT_GEN_A_CORE
Late note 2/2 patient care  OB called to bedside for severe range BP of 163/110 pt denied all toxic symptoms, Nifedipine 30mg XR was started at this time. At 23:20 OB was paged again for severe range BP of 166/102, repeat was 169/113. Pt was then seen at bedside. Pt denied all toxic symptoms including HA, Vision changes, SOB, and RUQ pain. Hydralazine 10mg IVP was then given at 23:53. repeat blood pressure was taken 20 min later and was mild range. OB then paged again to bedside for additional severe range BP of 163/104. Pt was seen at bedside in NAD. Pt denied all toxic symptoms at this time. 10mg Hydralzine was given IVP @ 0049. repeat blood pressure was taken 20 min later and was mild range. Dr Montelongo aware of pt and plan.

## 2021-11-02 LAB
ALBUMIN SERPL ELPH-MCNC: 3.4 G/DL — SIGNIFICANT CHANGE UP (ref 3.3–5)
ALP SERPL-CCNC: 68 U/L — SIGNIFICANT CHANGE UP (ref 40–120)
ALT FLD-CCNC: 49 U/L — HIGH (ref 10–45)
ANION GAP SERPL CALC-SCNC: 9 MMOL/L — SIGNIFICANT CHANGE UP (ref 5–17)
AST SERPL-CCNC: 47 U/L — HIGH (ref 10–40)
BILIRUB SERPL-MCNC: 0.2 MG/DL — SIGNIFICANT CHANGE UP (ref 0.2–1.2)
BUN SERPL-MCNC: 6 MG/DL — LOW (ref 7–23)
CALCIUM SERPL-MCNC: 7.6 MG/DL — LOW (ref 8.4–10.5)
CHLORIDE SERPL-SCNC: 106 MMOL/L — SIGNIFICANT CHANGE UP (ref 96–108)
CO2 SERPL-SCNC: 22 MMOL/L — SIGNIFICANT CHANGE UP (ref 22–31)
CREAT SERPL-MCNC: 0.48 MG/DL — LOW (ref 0.5–1.3)
GLUCOSE SERPL-MCNC: 100 MG/DL — HIGH (ref 70–99)
HCT VFR BLD CALC: 34.1 % — LOW (ref 34.5–45)
HGB BLD-MCNC: 11.5 G/DL — SIGNIFICANT CHANGE UP (ref 11.5–15.5)
MAGNESIUM SERPL-MCNC: 4.6 MG/DL — HIGH (ref 1.6–2.6)
MAGNESIUM SERPL-MCNC: 4.6 MG/DL — HIGH (ref 1.6–2.6)
MCHC RBC-ENTMCNC: 30.5 PG — SIGNIFICANT CHANGE UP (ref 27–34)
MCHC RBC-ENTMCNC: 33.7 GM/DL — SIGNIFICANT CHANGE UP (ref 32–36)
MCV RBC AUTO: 90.5 FL — SIGNIFICANT CHANGE UP (ref 80–100)
NRBC # BLD: 0 /100 WBCS — SIGNIFICANT CHANGE UP (ref 0–0)
PLATELET # BLD AUTO: 373 K/UL — SIGNIFICANT CHANGE UP (ref 150–400)
POTASSIUM SERPL-MCNC: 4 MMOL/L — SIGNIFICANT CHANGE UP (ref 3.5–5.3)
POTASSIUM SERPL-SCNC: 4 MMOL/L — SIGNIFICANT CHANGE UP (ref 3.5–5.3)
PROT SERPL-MCNC: 6.7 G/DL — SIGNIFICANT CHANGE UP (ref 6–8.3)
RBC # BLD: 3.77 M/UL — LOW (ref 3.8–5.2)
RBC # FLD: 13.6 % — SIGNIFICANT CHANGE UP (ref 10.3–14.5)
SODIUM SERPL-SCNC: 137 MMOL/L — SIGNIFICANT CHANGE UP (ref 135–145)
WBC # BLD: 9.32 K/UL — SIGNIFICANT CHANGE UP (ref 3.8–10.5)
WBC # FLD AUTO: 9.32 K/UL — SIGNIFICANT CHANGE UP (ref 3.8–10.5)

## 2021-11-02 RX ORDER — MAGNESIUM SULFATE 500 MG/ML
1.5 VIAL (ML) INJECTION
Qty: 40 | Refills: 0 | Status: DISCONTINUED | OUTPATIENT
Start: 2021-11-02 | End: 2021-11-05

## 2021-11-02 RX ORDER — HYDRALAZINE HCL 50 MG
10 TABLET ORAL ONCE
Refills: 0 | Status: DISCONTINUED | OUTPATIENT
Start: 2021-11-02 | End: 2021-11-02

## 2021-11-02 RX ORDER — LABETALOL HCL 100 MG
400 TABLET ORAL EVERY 8 HOURS
Refills: 0 | Status: DISCONTINUED | OUTPATIENT
Start: 2021-11-02 | End: 2021-11-03

## 2021-11-02 RX ORDER — NIFEDIPINE 30 MG
30 TABLET, EXTENDED RELEASE 24 HR ORAL EVERY 24 HOURS
Refills: 0 | Status: DISCONTINUED | OUTPATIENT
Start: 2021-11-03 | End: 2021-11-04

## 2021-11-02 RX ORDER — NIFEDIPINE 30 MG
60 TABLET, EXTENDED RELEASE 24 HR ORAL EVERY 24 HOURS
Refills: 0 | Status: DISCONTINUED | OUTPATIENT
Start: 2021-11-02 | End: 2021-11-04

## 2021-11-02 RX ORDER — LABETALOL HCL 100 MG
20 TABLET ORAL ONCE
Refills: 0 | Status: COMPLETED | OUTPATIENT
Start: 2021-11-02 | End: 2021-11-02

## 2021-11-02 RX ADMIN — Medication 300 MILLIGRAM(S): at 14:22

## 2021-11-02 RX ADMIN — ENOXAPARIN SODIUM 40 MILLIGRAM(S): 100 INJECTION SUBCUTANEOUS at 09:00

## 2021-11-02 RX ADMIN — Medication 975 MILLIGRAM(S): at 03:15

## 2021-11-02 RX ADMIN — Medication 975 MILLIGRAM(S): at 04:45

## 2021-11-02 RX ADMIN — Medication 975 MILLIGRAM(S): at 22:21

## 2021-11-02 RX ADMIN — Medication 975 MILLIGRAM(S): at 23:00

## 2021-11-02 RX ADMIN — Medication 975 MILLIGRAM(S): at 16:00

## 2021-11-02 RX ADMIN — Medication 20 MILLIGRAM(S): at 18:33

## 2021-11-02 RX ADMIN — Medication 975 MILLIGRAM(S): at 11:24

## 2021-11-02 RX ADMIN — Medication 400 MILLIGRAM(S): at 22:21

## 2021-11-02 RX ADMIN — Medication 60 MILLIGRAM(S): at 23:45

## 2021-11-02 RX ADMIN — Medication 600 MILLIGRAM(S): at 12:26

## 2021-11-02 RX ADMIN — Medication 975 MILLIGRAM(S): at 15:00

## 2021-11-02 RX ADMIN — Medication 300 MILLIGRAM(S): at 06:12

## 2021-11-02 RX ADMIN — Medication 600 MILLIGRAM(S): at 06:13

## 2021-11-02 RX ADMIN — Medication 600 MILLIGRAM(S): at 12:00

## 2021-11-02 RX ADMIN — Medication 600 MILLIGRAM(S): at 00:25

## 2021-11-02 RX ADMIN — Medication 975 MILLIGRAM(S): at 10:09

## 2021-11-02 RX ADMIN — Medication 600 MILLIGRAM(S): at 07:33

## 2021-11-02 NOTE — CHART NOTE - NSCHARTNOTEFT_GEN_A_CORE
Patient evaluated at bedside for clinical magnesium check. Serum magnesium drawn at 0800. She had a mild headache earlier this AM which has since resolved. States that she is feeling well and that her swelling is improving.  She denies visual disturbances/scotoma, headache, and RUQ pain. Also denies chest pain, palpitations, SOB, inc WOB, abd/epigastric pain,  nausea/vomiting. Pain well controlled.     T(C): 36.7 (11-02-21 @ 10:00), Max: 36.8 (11-02-21 @ 02:02)  HR: 103 (11-02-21 @ 10:00) (83 - 103)  BP: 148/100 (11-02-21 @ 10:00) (143/91 - 154/97)  RR: 19 (11-02-21 @ 10:00) (16 - 19)  SpO2: 98% (11-02-21 @ 10:00) (96% - 99%)      11-01 @ 07:01  -  11-02 @ 07:00  --------------------------------------------------------  IN: 0 mL / OUT: 2800 mL / NET: -2800 mL      Gen: NAD, AAOx3  CV: RRR, no M/R/G, mild range BPs  Pulm: CTAB, no R/R/W  Abd: soft, nontender, no rebound or guarding, no epigastric/RUQ tenderness, liver nonpalpable, fundus palpated   : voiding spontaneously  Ext: +1 pedal edema wendi 2+ upper extremity reflexes, 1-2 beats ankle clonus                          11.5   9.32  )-----------( 373      ( 02 Nov 2021 08:04 )             34.1     11-02    137  |  106  |  6<L>  ----------------------------<  100<H>  4.0   |  22  |  0.48<L>    Ca    7.6<L>      02 Nov 2021 08:04  Mg     4.6     11-02    TPro  6.7  /  Alb  3.4  /  TBili  0.2  /  DBili  x   /  AST  47<H>  /  ALT  49<H>  /  AlkPhos  68  11-02    acetaminophen     Tablet .. 975 milliGRAM(s) Oral <User Schedule>  diphenhydrAMINE 25 milliGRAM(s) Oral every 6 hours PRN  diphtheria/tetanus/pertussis (acellular) Vaccine (ADAcel) 0.5 milliLiter(s) IntraMuscular once  enoxaparin Injectable 40 milliGRAM(s) SubCutaneous every 24 hours  ibuprofen  Tablet. 600 milliGRAM(s) Oral every 6 hours  labetalol 300 milliGRAM(s) Oral every 8 hours  lactated ringers. 1000 milliLiter(s) IV Continuous <Continuous>  lanolin Ointment 1 Application(s) Topical every 6 hours PRN  magnesium hydroxide Suspension 30 milliLiter(s) Oral two times a day PRN  magnesium sulfate Infusion 2 Gm/Hr IV Continuous <Continuous>  NIFEdipine XL 60 milliGRAM(s) Oral every 24 hours  oxyCODONE    IR 5 milliGRAM(s) Oral every 3 hours PRN  oxyCODONE    IR 5 milliGRAM(s) Oral once PRN  oxytocin Infusion 333.333 milliUNIT(s)/Min IV Continuous <Continuous>  oxytocin Infusion 333.333 milliUNIT(s)/Min IV Continuous <Continuous>  simethicone 80 milliGRAM(s) Chew every 4 hours PRN      11-01-21 @ 07:01  -  11-02-21 @ 07:00  --------------------------------------------------------  IN: 0 mL / OUT: 2800 mL / NET: -2800 mL    A&P:  36y  s/p pCS complicated by preeclampsia with severe features  No complaints currently. No severe range BP.  Antihypertensives: labetalol 300mg TID, nifedipine 60mg QD  Mg level is 4.6    - Continue IV Magnesium @2G/hr until 1400  - Continue to monitor blood pressures  - GI: red diet  - : strict Is and Os

## 2021-11-02 NOTE — DIETITIAN INITIAL EVALUATION ADULT. - OTHER INFO
35y/o female S/P C/S @38 and 4 days gestation with complicated Pre-eclampsia.No N/V/D/C or pain reported.Skin with surgical incision.Pre-pregnancy weight:79kg,IBW:63.6kg.124% of IBW.Eating adequate amounts of meals.Breast feeding.

## 2021-11-02 NOTE — PROGRESS NOTE ADULT - ASSESSMENT
A&P:   36y  s/p pC/S complicated by preeclampsia with severe features      1. Preeclampsia: Continue IV Magnesium @2G/hr for 24 hrs post delivery.  No complaints currently.   Antihypertensives: Increased to Nifedipine 60XR qd and Labetalol 300mg TID  Continue to monitor blood pressures  Next Magnesium check @ 0200       2. GI: regular diet    3. : strict Is and Os      Discussed with Dr. Smalls and Dr. Knapp PGY3

## 2021-11-02 NOTE — CHART NOTE - NSCHARTNOTEFT_GEN_A_CORE
Notified by nurse of severe range /107 at 1810. Repeat BP 15 min later persistently severe range. Pt seen at bedside. States that she is feeling well. Denies HA, vision changes/scotoma, chest pain, palpitations, SOB or worsening edema.    Gen: lying in bed, breastfeeding  CV: RRR  Pulm: CTA b/l  Abd: NT/ND, no RUQ/epigastric tenderness  Ext: edema to mid calves b/l    35 y/o  POD5 from pCS c/b siPEC w/ SF (HA, BP)  - S/p IV magnesium x24hrs   - IV labetalol 20mg administered, will repeat BP in 10min  - Nephrology consulted  - D/w Dr. Diez, PGY4  - D/w LETICIA Centeno Attending

## 2021-11-02 NOTE — PROGRESS NOTE ADULT - ASSESSMENT
A&P:   36y  s/p pC/S complicated by preeclampsia with severe features      1. Preeclampsia: Continue IV Magnesium @2G/hr for 24 hrs post delivery.  No complaints currently.   Antihypertensives:   Continue to monitor blood pressures  Next Magnesium check @ 0800  Mag level 4.6    2. GI: Cregular diet    3. : strict Is and Os

## 2021-11-02 NOTE — CONSULT NOTE ADULT - ASSESSMENT
36F with pmhx of HTN who presents for pregnancy c/b PEC w/ SF. Nephrology consulted for HTN management    Assessment/Plan:   #PEC w/SF  Per patient did endorse having symptoms of headache on 11/1; symptoms have abated since then however BP remains elevated. Had BP controlled during pregnancy with the use of labetalol. Has had increasing  dose requirements while in the hospital.   -S/p IV Magnesium  -The goal BP for this patient is SBP of 120-140 and DBP of 70-90   -C/w Nifedipine 60mg Q24h  -Increase labetalol to 400mg TID; if SBP > 140 or SBP > 90, can give  additional dose of labetalol 400mg OR 30mg Nifedipine XL.   -avoid NSAIDs, pain control w/Tylenol or other analgesics   -daily BMP   -strict I/Os       Lizeth Figueroa D.O.  PGY 4 - Nephrology Fellow  [phone]

## 2021-11-02 NOTE — PROGRESS NOTE ADULT - ASSESSMENT
A/P: 36y POD5 s/p primary  section for failed IOL c/b superimposed PEC w/ SF. Pt is hemodynamically stable.    PEC w/ SF  - Cont IV magnesium for 24hrs  - Next serum magnesium check at 0800  - Most recent serum Mg 4.6  - Cont labetalol 300 TID nifedipine 60 QD  - VSq4  - Strict I&Os    Postpartum care  -  Neuro-Pain control with motrin/acetaminophen, oxycodone for severe pain PRN  -  Cardio: no issues. Continue to monitor routinely   -  GI: Reg diet  -  : voiding spontanesouly  -  DVT prophylaxis: Lovenox 40mg qd  -  Activity- ambulate as tolerated   -  Heme: post-op hemoglobin stable

## 2021-11-02 NOTE — CONSULT NOTE ADULT - SUBJECTIVE AND OBJECTIVE BOX
Patient is a 36y old  Female who presents with a chief complaint of pp (2021 12:33)      HPI:  36F with pmhx of HTN who presents for pregnancy c/b PEC w/ SF. Per patient has known history of HTN prior to pregnancy; when pregnancy occurred she was requiring blood pressure medications. She was started on labetalol 100mg BID which towards the end of the pregnancy was 200mg, 100mg and 100mg dosing. She states she was fine post delivery on Thursday and on Saturday started to develop high blood pressure and it has been uncontrolled since then. States on  she had a headache team became concerned for pre-eclampsia with SF and gave her IV magnesium. At my time of evaluation states headache gone, no chest pain, SOB or edema. Nephrology consulted for PEC w/ SF.       PAST MEDICAL & SURGICAL HISTORY:    Allergies:  Allergy Status Unknown      Home Medications:   acetaminophen     Tablet .. 975 milliGRAM(s) Oral <User Schedule>  diphenhydrAMINE 25 milliGRAM(s) Oral every 6 hours PRN  diphtheria/tetanus/pertussis (acellular) Vaccine (ADAcel) 0.5 milliLiter(s) IntraMuscular once  enoxaparin Injectable 40 milliGRAM(s) SubCutaneous every 24 hours  ibuprofen  Tablet. 600 milliGRAM(s) Oral every 6 hours  labetalol 300 milliGRAM(s) Oral every 8 hours  lactated ringers. 1000 milliLiter(s) IV Continuous <Continuous>  lanolin Ointment 1 Application(s) Topical every 6 hours PRN  magnesium hydroxide Suspension 30 milliLiter(s) Oral two times a day PRN  magnesium sulfate Infusion 1.5 Gm/Hr IV Continuous <Continuous>  NIFEdipine XL 60 milliGRAM(s) Oral every 24 hours  oxyCODONE    IR 5 milliGRAM(s) Oral every 3 hours PRN  oxyCODONE    IR 5 milliGRAM(s) Oral once PRN  oxytocin Infusion 333.333 milliUNIT(s)/Min IV Continuous <Continuous>  oxytocin Infusion 333.333 milliUNIT(s)/Min IV Continuous <Continuous>  simethicone 80 milliGRAM(s) Chew every 4 hours PRN      Hospital Medications:   MEDICATIONS  (STANDING):  acetaminophen     Tablet .. 975 milliGRAM(s) Oral <User Schedule>  diphtheria/tetanus/pertussis (acellular) Vaccine (ADAcel) 0.5 milliLiter(s) IntraMuscular once  enoxaparin Injectable 40 milliGRAM(s) SubCutaneous every 24 hours  ibuprofen  Tablet. 600 milliGRAM(s) Oral every 6 hours  labetalol 300 milliGRAM(s) Oral every 8 hours  lactated ringers. 1000 milliLiter(s) (75 mL/Hr) IV Continuous <Continuous>  magnesium sulfate Infusion 1.5 Gm/Hr (37.5 mL/Hr) IV Continuous <Continuous>  NIFEdipine XL 60 milliGRAM(s) Oral every 24 hours  oxytocin Infusion 333.333 milliUNIT(s)/Min (1000 mL/Hr) IV Continuous <Continuous>  oxytocin Infusion 333.333 milliUNIT(s)/Min (1000 mL/Hr) IV Continuous <Continuous>      SOCIAL HISTORY:  Denies ETOh, Smoking,     Family History:  FAMILY HISTORY:  non-contributory    VITALS:  T(F): 97.8 (21 @ 14:03), Max: 98.2 (21 @ 02:02)  HR: 86 (21 @ 14:03)  BP: 154/97 (21 @ 14:03)  RR: 19 (21 @ 14:03)  SpO2: 99% (21 @ 14:03)  Wt(kg): --     @ :  -   @ 07:00  --------------------------------------------------------  IN: 0 mL / OUT: 2800 mL / NET: -2800 mL     @ 07:  -   @ 18:23  --------------------------------------------------------  IN: 0 mL / OUT: 2600 mL / NET: -2600 mL        CAPILLARY BLOOD GLUCOSE          Review of Systems:  10 point ROS negative except as per HPI    PHYSICAL EXAM:  GENERAL: Alert, awake, NAD on RA  HEENT: NCAT neck supple, no JVP  CHEST/LUNG: Bilateral clear breath sounds, no rales ronchi or wheezing  HEART: Regular rate and rhythm, no murmur, no gallops, no rub   ABDOMEN: Soft, nontender, non distended  EXTREMITIES: no pedal edema, WWP  Neurology: AAOx3, no focal neurological deficit      LABS:      137  |  106  |  6<L>  ----------------------------<  100<H>  4.0   |  22  |  0.48<L>    Ca    7.6<L>      2021 08:04  Mg     4.6         TPro  6.7  /  Alb  3.4  /  TBili  0.2  /  DBili      /  AST  47<H>  /  ALT  49<H>  /  AlkPhos  68      Creatinine Trend: 0.48 <--, 0.57 <--, 0.54 <--, 0.64 <--, 0.54 <--, 0.53 <--                        11.5   9.32  )-----------( 373      ( 2021 08:04 )             34.1     Urine Studies:  Urinalysis Basic - ( 31 Oct 2021 15:56 )    Color: Yellow / Appearance: Clear / S.015 / pH:   Gluc:  / Ketone: NEGATIVE  / Bili: Negative / Urobili: 0.2 E.U./dL   Blood:  / Protein: NEGATIVE mg/dL / Nitrite: NEGATIVE   Leuk Esterase: NEGATIVE / RBC: 5-10 /HPF / WBC < 5 /HPF   Sq Epi:  / Non Sq Epi: 0-5 /HPF / Bacteria: Present /HPF      Creatinine, Random Urine: 15 mg/dL (10-31 @ 15:56)  Protein/Creatinine Ratio Calculation: Unable to calculate (10-31 @ 15:56)  Creatinine, Random Urine: 55 mg/dL (10-27 @ 11:19)  Protein/Creatinine Ratio Calculation: 0.1 Ratio (10-27 @ 11:19)

## 2021-11-03 LAB
ANION GAP SERPL CALC-SCNC: 10 MMOL/L — SIGNIFICANT CHANGE UP (ref 5–17)
BUN SERPL-MCNC: 10 MG/DL — SIGNIFICANT CHANGE UP (ref 7–23)
CALCIUM SERPL-MCNC: 9.4 MG/DL — SIGNIFICANT CHANGE UP (ref 8.4–10.5)
CHLORIDE SERPL-SCNC: 109 MMOL/L — HIGH (ref 96–108)
CO2 SERPL-SCNC: 22 MMOL/L — SIGNIFICANT CHANGE UP (ref 22–31)
CREAT SERPL-MCNC: 0.6 MG/DL — SIGNIFICANT CHANGE UP (ref 0.5–1.3)
GLUCOSE SERPL-MCNC: 94 MG/DL — SIGNIFICANT CHANGE UP (ref 70–99)
POTASSIUM SERPL-MCNC: 4.4 MMOL/L — SIGNIFICANT CHANGE UP (ref 3.5–5.3)
POTASSIUM SERPL-SCNC: 4.4 MMOL/L — SIGNIFICANT CHANGE UP (ref 3.5–5.3)
SODIUM SERPL-SCNC: 141 MMOL/L — SIGNIFICANT CHANGE UP (ref 135–145)

## 2021-11-03 PROCEDURE — 99222 1ST HOSP IP/OBS MODERATE 55: CPT | Mod: GC

## 2021-11-03 RX ORDER — LABETALOL HCL 100 MG
200 TABLET ORAL ONCE
Refills: 0 | Status: COMPLETED | OUTPATIENT
Start: 2021-11-03 | End: 2021-11-03

## 2021-11-03 RX ORDER — LABETALOL HCL 100 MG
600 TABLET ORAL EVERY 8 HOURS
Refills: 0 | Status: DISCONTINUED | OUTPATIENT
Start: 2021-11-03 | End: 2021-11-04

## 2021-11-03 RX ORDER — HYDRALAZINE HCL 50 MG
10 TABLET ORAL ONCE
Refills: 0 | Status: COMPLETED | OUTPATIENT
Start: 2021-11-03 | End: 2021-11-03

## 2021-11-03 RX ADMIN — Medication 400 MILLIGRAM(S): at 06:34

## 2021-11-03 RX ADMIN — Medication 400 MILLIGRAM(S): at 14:38

## 2021-11-03 RX ADMIN — Medication 60 MILLIGRAM(S): at 22:05

## 2021-11-03 RX ADMIN — Medication 30 MILLIGRAM(S): at 09:11

## 2021-11-03 RX ADMIN — Medication 600 MILLIGRAM(S): at 22:05

## 2021-11-03 RX ADMIN — Medication 200 MILLIGRAM(S): at 17:55

## 2021-11-03 RX ADMIN — Medication 10 MILLIGRAM(S): at 22:24

## 2021-11-03 RX ADMIN — ENOXAPARIN SODIUM 40 MILLIGRAM(S): 100 INJECTION SUBCUTANEOUS at 09:11

## 2021-11-03 NOTE — PROGRESS NOTE ADULT - ASSESSMENT
36F with pmhx of HTN who presents for pregnancy c/b PEC w/ SF. Nephrology consulted for HTN management    Assessment/Plan:   #PEC w/SF  Per patient did endorse having symptoms of headache on 11/1; symptoms have abated since then however BP remains elevated. Had BP controlled during pregnancy with the use of labetalol. Has had increasing  dose requirements while in the hospital.   -S/p IV Magnesium  -The goal BP for this patient is SBP of 120-140 and DBP of 70-90   -Agree with Nifedipine 30mg Qam and 60mg QHS  -Increase labetalol to 600mg TID; if SBP > 140 or SBP > 90, can give  additional dose of labetalol 600mg OR 30mg Nifedipine XL.   -avoid NSAIDs, pain control w/Tylenol or other analgesics   -daily BMP   -strict I/Os       Lizeth Figueroa D.O.  PGY 4 - Nephrology Fellow  [phone] 36F with pmhx of HTN who presents for pregnancy c/b PEC w/ SF. Nephrology consulted for HTN management    Assessment/Plan:   #PEC w/SF  Per patient did endorse having symptoms of headache on 11/1; symptoms have abated since then however BP remains elevated. Had BP controlled during pregnancy with the use of labetalol. Has had increasing  dose requirements while in the hospital.   -S/p IV Magnesium  -The goal BP for this patient is SBP of 120-140 and DBP of 70-90   -Agree with Nifedipine 30mg Qam and 60mg QHS  -Increase labetalol to 600mg TID; if SBP > 140 or SBP > 90, can give  additional dose of labetalol 600mg OR 30mg Nifedipine XL.   -avoid NSAIDs, pain control w/Tylenol or other analgesics   -daily BMP   -strict I/Os   -On DC should follow up with Dr. Johanny Paredes MD from Nephrology      Lizeth Figueroa D.O.  PGY 4 - Nephrology Fellow  [phone]

## 2021-11-03 NOTE — PROGRESS NOTE ADULT - ASSESSMENT
A/P: 36y POD6 s/p primary  section c/b siPEC w/ SF. Pt is hemodynamically stable.     siPEC w/ SF  - S/p IV magnesium x24hrs  - Nephrology consulted for persistently elevated BP, appreciate recs  - Continue labetalol 400mg TID  - Continue nifedipine 30mg QAM  - Continue nifedipine 60mg QHS  - VSq4    Postpartum state  -  Neuro-Pain control with motrin/acetaminophen, oxycodone for severe pain PRN  -  Cardio: no issues. Continue to monitor routinely   -  GI: Reg diet  -  : voiding spontaneously  -  DVT prophylaxis: Lovenox 40mg qd  -  Activity- ambulate as tolerated   -  Heme: post-op hemoglobin stable   - Dispo: d/c pending BP stabilization

## 2021-11-04 LAB
ALBUMIN SERPL ELPH-MCNC: 3.9 G/DL — SIGNIFICANT CHANGE UP (ref 3.3–5)
ALP SERPL-CCNC: 66 U/L — SIGNIFICANT CHANGE UP (ref 40–120)
ALT FLD-CCNC: 54 U/L — HIGH (ref 10–45)
ANION GAP SERPL CALC-SCNC: 13 MMOL/L — SIGNIFICANT CHANGE UP (ref 5–17)
AST SERPL-CCNC: 39 U/L — SIGNIFICANT CHANGE UP (ref 10–40)
BASOPHILS # BLD AUTO: 0.04 K/UL — SIGNIFICANT CHANGE UP (ref 0–0.2)
BASOPHILS NFR BLD AUTO: 0.5 % — SIGNIFICANT CHANGE UP (ref 0–2)
BILIRUB SERPL-MCNC: 0.3 MG/DL — SIGNIFICANT CHANGE UP (ref 0.2–1.2)
BUN SERPL-MCNC: 10 MG/DL — SIGNIFICANT CHANGE UP (ref 7–23)
CALCIUM SERPL-MCNC: 9.8 MG/DL — SIGNIFICANT CHANGE UP (ref 8.4–10.5)
CHLORIDE SERPL-SCNC: 105 MMOL/L — SIGNIFICANT CHANGE UP (ref 96–108)
CO2 SERPL-SCNC: 21 MMOL/L — LOW (ref 22–31)
CREAT SERPL-MCNC: 0.65 MG/DL — SIGNIFICANT CHANGE UP (ref 0.5–1.3)
EOSINOPHIL # BLD AUTO: 0.21 K/UL — SIGNIFICANT CHANGE UP (ref 0–0.5)
EOSINOPHIL NFR BLD AUTO: 2.7 % — SIGNIFICANT CHANGE UP (ref 0–6)
GLUCOSE SERPL-MCNC: 141 MG/DL — HIGH (ref 70–99)
HCT VFR BLD CALC: 36.9 % — SIGNIFICANT CHANGE UP (ref 34.5–45)
HGB BLD-MCNC: 12.4 G/DL — SIGNIFICANT CHANGE UP (ref 11.5–15.5)
IMM GRANULOCYTES NFR BLD AUTO: 0.4 % — SIGNIFICANT CHANGE UP (ref 0–1.5)
LDH SERPL L TO P-CCNC: SIGNIFICANT CHANGE UP U/L (ref 50–242)
LYMPHOCYTES # BLD AUTO: 1.75 K/UL — SIGNIFICANT CHANGE UP (ref 1–3.3)
LYMPHOCYTES # BLD AUTO: 22.7 % — SIGNIFICANT CHANGE UP (ref 13–44)
MCHC RBC-ENTMCNC: 30 PG — SIGNIFICANT CHANGE UP (ref 27–34)
MCHC RBC-ENTMCNC: 33.6 GM/DL — SIGNIFICANT CHANGE UP (ref 32–36)
MCV RBC AUTO: 89.3 FL — SIGNIFICANT CHANGE UP (ref 80–100)
MONOCYTES # BLD AUTO: 0.51 K/UL — SIGNIFICANT CHANGE UP (ref 0–0.9)
MONOCYTES NFR BLD AUTO: 6.6 % — SIGNIFICANT CHANGE UP (ref 2–14)
NEUTROPHILS # BLD AUTO: 5.18 K/UL — SIGNIFICANT CHANGE UP (ref 1.8–7.4)
NEUTROPHILS NFR BLD AUTO: 67.1 % — SIGNIFICANT CHANGE UP (ref 43–77)
NRBC # BLD: 0 /100 WBCS — SIGNIFICANT CHANGE UP (ref 0–0)
PLATELET # BLD AUTO: 445 K/UL — HIGH (ref 150–400)
POTASSIUM SERPL-MCNC: 3.9 MMOL/L — SIGNIFICANT CHANGE UP (ref 3.5–5.3)
POTASSIUM SERPL-SCNC: 3.9 MMOL/L — SIGNIFICANT CHANGE UP (ref 3.5–5.3)
PROT SERPL-MCNC: 7.5 G/DL — SIGNIFICANT CHANGE UP (ref 6–8.3)
RBC # BLD: 4.13 M/UL — SIGNIFICANT CHANGE UP (ref 3.8–5.2)
RBC # FLD: 13.3 % — SIGNIFICANT CHANGE UP (ref 10.3–14.5)
SODIUM SERPL-SCNC: 139 MMOL/L — SIGNIFICANT CHANGE UP (ref 135–145)
URATE SERPL-MCNC: 6.1 MG/DL — SIGNIFICANT CHANGE UP (ref 2.5–7)
WBC # BLD: 7.72 K/UL — SIGNIFICANT CHANGE UP (ref 3.8–10.5)
WBC # FLD AUTO: 7.72 K/UL — SIGNIFICANT CHANGE UP (ref 3.8–10.5)

## 2021-11-04 PROCEDURE — 99232 SBSQ HOSP IP/OBS MODERATE 35: CPT | Mod: GC

## 2021-11-04 RX ORDER — NIFEDIPINE 30 MG
30 TABLET, EXTENDED RELEASE 24 HR ORAL ONCE
Refills: 0 | Status: COMPLETED | OUTPATIENT
Start: 2021-11-04 | End: 2021-11-04

## 2021-11-04 RX ORDER — LABETALOL HCL 100 MG
700 TABLET ORAL EVERY 8 HOURS
Refills: 0 | Status: DISCONTINUED | OUTPATIENT
Start: 2021-11-04 | End: 2021-11-05

## 2021-11-04 RX ORDER — NIFEDIPINE 30 MG
60 TABLET, EXTENDED RELEASE 24 HR ORAL EVERY 12 HOURS
Refills: 0 | Status: DISCONTINUED | OUTPATIENT
Start: 2021-11-04 | End: 2021-11-05

## 2021-11-04 RX ADMIN — Medication 975 MILLIGRAM(S): at 06:52

## 2021-11-04 RX ADMIN — Medication 30 MILLIGRAM(S): at 14:59

## 2021-11-04 RX ADMIN — Medication 60 MILLIGRAM(S): at 19:21

## 2021-11-04 RX ADMIN — Medication 975 MILLIGRAM(S): at 06:04

## 2021-11-04 RX ADMIN — Medication 600 MILLIGRAM(S): at 13:43

## 2021-11-04 RX ADMIN — Medication 600 MILLIGRAM(S): at 06:05

## 2021-11-04 RX ADMIN — Medication 700 MILLIGRAM(S): at 21:10

## 2021-11-04 RX ADMIN — ENOXAPARIN SODIUM 40 MILLIGRAM(S): 100 INJECTION SUBCUTANEOUS at 06:05

## 2021-11-04 NOTE — PROGRESS NOTE ADULT - ASSESSMENT
36F with pmhx of HTN who presents for pregnancy c/b PEC w/ SF. Nephrology consulted for HTN management    Assessment/Plan:   #PEC w/SF  Per patient did endorse having symptoms of headache on 11/1; symptoms have abated since then however BP remains elevated. Had BP controlled during pregnancy with the use of labetalol. Has had increasing  dose requirements while in the hospital.   -S/p IV Magnesium  -The goal BP for this patient is SBP of 120-140 and DBP of 70-90   -C/w Nifedipine 60mg Qday  -c/w labetalol  600mg TID; if SBP > 140 or SBP > 90, can give additional dose of labetalol 600mg OR 30mg Nifedipine XL.   -avoid NSAIDs, pain control w/Tylenol or other analgesics   -daily BMP   -strict I/Os   -On DC should follow up with Dr. Johanny Paredes MD from Nephrology      Lizeth Figueroa D.O.  PGY 4 - Nephrology Fellow  [phone]

## 2021-11-04 NOTE — CHART NOTE - NSCHARTNOTEFT_GEN_A_CORE
OB paged about pt having severe range BP. Repeat BP 15 min later was 155/114. Pt was then seen at bedside and pt denied all toxic symptoms at this time. 10mg hydralazine IVP was given at 22:24. Repeat BP was taken 20 min later and was mild range. Pt to continue Labetalol 60mg TID and Nifedipine 60XR at 2300 and Nifedipine 30XR and 0800qd

## 2021-11-04 NOTE — PROGRESS NOTE ADULT - ASSESSMENT
A/P: 36y POD7 s/p primary  section c/b superimposed PEC w/ SF. Pt is hemodynamically stable.     Superimposed PEC w/ SF  - S/p 24hr IV magnesium  - Continue labetalol 600mg TID  - Continue nifedipine 30mg qAM  - Continue nifedipine 60mg qPM  - VSq4  - If adequate BP control throughout the day today, possible d/c this evening  - Will repeat full labs    Postpartum care  -  Neuro-Pain control with motrin/acetaminophen  -  Cardio: VSq4  -  GI: Reg diet  -  : voiding  -  DVT prophylaxis: Lovenox 40mg qd  -  Activity- ambulate as tolerated   -  Heme: post-op hemoglobin stable

## 2021-11-05 ENCOUNTER — TRANSCRIPTION ENCOUNTER (OUTPATIENT)
Age: 36
End: 2021-11-05

## 2021-11-05 VITALS
SYSTOLIC BLOOD PRESSURE: 125 MMHG | TEMPERATURE: 98 F | OXYGEN SATURATION: 98 % | RESPIRATION RATE: 18 BRPM | HEART RATE: 80 BPM | DIASTOLIC BLOOD PRESSURE: 81 MMHG

## 2021-11-05 PROCEDURE — 84550 ASSAY OF BLOOD/URIC ACID: CPT

## 2021-11-05 PROCEDURE — 36415 COLL VENOUS BLD VENIPUNCTURE: CPT

## 2021-11-05 PROCEDURE — 86780 TREPONEMA PALLIDUM: CPT

## 2021-11-05 PROCEDURE — 82570 ASSAY OF URINE CREATININE: CPT

## 2021-11-05 PROCEDURE — 85027 COMPLETE CBC AUTOMATED: CPT

## 2021-11-05 PROCEDURE — 80053 COMPREHEN METABOLIC PANEL: CPT

## 2021-11-05 PROCEDURE — 85610 PROTHROMBIN TIME: CPT

## 2021-11-05 PROCEDURE — 88307 TISSUE EXAM BY PATHOLOGIST: CPT

## 2021-11-05 PROCEDURE — 86850 RBC ANTIBODY SCREEN: CPT

## 2021-11-05 PROCEDURE — 81003 URINALYSIS AUTO W/O SCOPE: CPT

## 2021-11-05 PROCEDURE — 83735 ASSAY OF MAGNESIUM: CPT

## 2021-11-05 PROCEDURE — 86901 BLOOD TYPING SEROLOGIC RH(D): CPT

## 2021-11-05 PROCEDURE — 80048 BASIC METABOLIC PNL TOTAL CA: CPT

## 2021-11-05 PROCEDURE — 59050 FETAL MONITOR W/REPORT: CPT

## 2021-11-05 PROCEDURE — 83615 LACTATE (LD) (LDH) ENZYME: CPT

## 2021-11-05 PROCEDURE — 85730 THROMBOPLASTIN TIME PARTIAL: CPT

## 2021-11-05 PROCEDURE — 85025 COMPLETE CBC W/AUTO DIFF WBC: CPT

## 2021-11-05 PROCEDURE — 86900 BLOOD TYPING SEROLOGIC ABO: CPT

## 2021-11-05 PROCEDURE — 84156 ASSAY OF PROTEIN URINE: CPT

## 2021-11-05 PROCEDURE — 85384 FIBRINOGEN ACTIVITY: CPT

## 2021-11-05 PROCEDURE — 81001 URINALYSIS AUTO W/SCOPE: CPT

## 2021-11-05 PROCEDURE — 86769 SARS-COV-2 COVID-19 ANTIBODY: CPT

## 2021-11-05 RX ORDER — LABETALOL HCL 100 MG
3 TABLET ORAL
Qty: 270 | Refills: 0
Start: 2021-11-05 | End: 2021-12-04

## 2021-11-05 RX ORDER — IBUPROFEN 200 MG
1 TABLET ORAL
Qty: 0 | Refills: 0 | DISCHARGE
Start: 2021-11-05

## 2021-11-05 RX ORDER — ACETAMINOPHEN 500 MG
2 TABLET ORAL
Qty: 120 | Refills: 0
Start: 2021-11-05 | End: 2021-11-19

## 2021-11-05 RX ORDER — LABETALOL HCL 100 MG
7 TABLET ORAL
Qty: 0 | Refills: 0 | DISCHARGE
Start: 2021-11-05

## 2021-11-05 RX ORDER — NIFEDIPINE 30 MG
1 TABLET, EXTENDED RELEASE 24 HR ORAL
Qty: 0 | Refills: 0 | DISCHARGE
Start: 2021-11-05

## 2021-11-05 RX ORDER — LABETALOL HCL 100 MG
1 TABLET ORAL
Qty: 90 | Refills: 0
Start: 2021-11-05 | End: 2021-12-04

## 2021-11-05 RX ORDER — IBUPROFEN 200 MG
3 TABLET ORAL
Qty: 180 | Refills: 0
Start: 2021-11-05 | End: 2021-11-19

## 2021-11-05 RX ORDER — ACETAMINOPHEN 500 MG
3 TABLET ORAL
Qty: 0 | Refills: 0 | DISCHARGE
Start: 2021-11-05

## 2021-11-05 RX ORDER — NIFEDIPINE 30 MG
1 TABLET, EXTENDED RELEASE 24 HR ORAL
Qty: 60 | Refills: 0
Start: 2021-11-05 | End: 2021-12-04

## 2021-11-05 RX ADMIN — ENOXAPARIN SODIUM 40 MILLIGRAM(S): 100 INJECTION SUBCUTANEOUS at 06:52

## 2021-11-05 RX ADMIN — Medication 975 MILLIGRAM(S): at 10:12

## 2021-11-05 RX ADMIN — Medication 700 MILLIGRAM(S): at 05:06

## 2021-11-05 RX ADMIN — Medication 60 MILLIGRAM(S): at 07:09

## 2021-11-05 RX ADMIN — Medication 975 MILLIGRAM(S): at 11:11

## 2021-11-05 NOTE — PROGRESS NOTE ADULT - ASSESSMENT
A/P: 36y POD8 s/p primary  section for superimposed PEC w/ SF (BP and HA)    Superimposed PEC w/ SF      -  Neuro-Pain control with motrin/acetaminophen, oxycodone for severe pain PRN  -  Cardio: no issues. Continue to monitor routinely   -  GI: Clear/Reg diet, Reglan/Zofran prn   -  : s/p pressley- TOV   -  DVT prophylaxis: Lovenox 40mg qd, SCDs  -  Activity- ambulated as tolerated   -  Heme: post-op hemoglobin stable  A/P: 36y POD8 s/p primary  section for superimposed PEC w/ SF (BP and HA)    Superimposed PEC w/ SF  - S/p 24hr IV magnesium  - Continue labetalol 700mg TID  - Continue nifedipine 60mg BID  - Normotensive to mild range BP overnight  - Possible d/c today after reviewing BP w/ attending physician    Postpartum Care  -  Neuro-Pain control with motrin/acetaminophen  -  Cardio: normal to mild range BP overnight, VSq4   -  GI: Reg diet  -  : voiding spontaneously  -  DVT prophylaxis: Lovenox 40mg qd  -  Activity- ambulate as tolerated   -  Heme: post-op hemoglobin stable

## 2021-11-05 NOTE — DISCHARGE NOTE OB - CARE PLAN
1 Principal Discharge DX:	 delivery delivered  Assessment and plan of treatment:	# Continue with Motrin 600mg and Acetaminophen 650mg every 6 hours each (stagger for betted effect).   # You can shower but no baths.  # No heavy lifting- no more than a gallon of milk.  Secondary Diagnosis:	Preeclampsia  Assessment and plan of treatment:	# Please continue with Labetalol 700mg every 8 hours and Nifedipine 60mg every 12 hours.    # Monitor blood pressure three times a day (preferably before taking the medications). Please document the blood pressure values to review with obstetrician at follow-up appointment.   # If your blood pressure is equal to or greater than 160/110 (either one) OR if you experience any of the following symptoms: changes in vision, headache not relieved with Tylenol, severe abdominal pain, vomiting, increased vaginal bleeding, chest pain or shortness of breath, please call your doctor and return to the hospital.  # If your blood pressure is equal to or greater than 150/100 (either one), please call your obstetrician.  # Hold parameters: If the blood pressure is lower than 110/60 (either one) skip that dose of medication and inform your doctor.  # Please schedule an appointment at your physician office in one week.

## 2021-11-05 NOTE — DISCHARGE NOTE OB - PLAN OF CARE
# Please continue with Labetalol 700mg every 8 hours and Nifedipine 60mg every 12 hours.    # Monitor blood pressure three times a day (preferably before taking the medications). Please document the blood pressure values to review with obstetrician at follow-up appointment.   # If your blood pressure is equal to or greater than 160/110 (either one) OR if you experience any of the following symptoms: changes in vision, headache not relieved with Tylenol, severe abdominal pain, vomiting, increased vaginal bleeding, chest pain or shortness of breath, please call your doctor and return to the hospital.  # If your blood pressure is equal to or greater than 150/100 (either one), please call your obstetrician.  # Hold parameters: If the blood pressure is lower than 110/60 (either one) skip that dose of medication and inform your doctor.  # Please schedule an appointment at your physician office in one week. # Continue with Motrin 600mg and Acetaminophen 650mg every 6 hours each (stagger for betted effect).   # You can shower but no baths.  # No heavy lifting- no more than a gallon of milk.

## 2021-11-05 NOTE — DISCHARGE NOTE OB - HOSPITAL COURSE
Pt had an uncomplicated c/s delivery. Her postpartum course was complicated by superimposed preeclampsia with severely elevated BPs. She was treated with Magnesium for 24 hours. Her vital signs were monitored closely and anti HTN medications were татьяна up gradually. Her blood pressures were finally controlled stable under treatment with Labetalol 700mg every 8 hours and Nifedipine 60mg BID.  Prior to discharge the Pt is feeling well, lab results are stable and vital signs are stable for discharge.   Will continue close monitoring as an outpatient with Dr. Montelongo.

## 2021-11-05 NOTE — DISCHARGE NOTE OB - MEDICATION SUMMARY - MEDICATIONS TO TAKE
I will START or STAY ON the medications listed below when I get home from the hospital:    acetaminophen 325 mg oral tablet  -- 3 tab(s) by mouth   -- Indication: For Pain    ibuprofen 600 mg oral tablet  -- 1 tab(s) by mouth every 6 hours  -- Indication: For Pain    labetalol 100 mg oral tablet  -- 7 tab(s) by mouth every 8 hours  -- Indication: For Hypertension    NIFEdipine 60 mg oral tablet, extended release  -- 1 tab(s) by mouth every 12 hours  -- Indication: For Hypertension

## 2021-11-05 NOTE — PROGRESS NOTE ADULT - SUBJECTIVE AND OBJECTIVE BOX
Overnight, pt evaluated for multiple severe range BP s/p multiple IVP meds. Discussion w/ attending, decision made to hold off on IV magnesium.     Patient seen and evaluated at bedside. Pt states she has mild abdominal pain, overall controlled with pain medication. She is tolerating reg diet, passing flatus, and voiding. Pt is breastfeeding.  She denies headache, vision changes/scotoma, dizziness, chest pain, palpitations, shortness of breath, RUQ/epigastric pain, nausea, vomiting, or heavy vaginal bleeding.    Physical Exam:  Vital Signs Last 24 Hrs  T(C): 36.8 (01 Nov 2021 05:25), Max: 36.8 (31 Oct 2021 10:00)  T(F): 98.2 (01 Nov 2021 05:25), Max: 98.2 (31 Oct 2021 10:00)  HR: 88 (01 Nov 2021 05:25) (70 - 108)  BP: 139/91 (01 Nov 2021 05:25) (125/85 - 169/113)  RR: 18 (01 Nov 2021 05:25) (17 - 18)  SpO2: 98% (01 Nov 2021 05:25) (96% - 100%)    GA: comfortable in NAD  CV: reg rate, normal to severe range BP  Abd: soft, nontender, nondistended, no rebound or guarding, incision clean, dry and intact, uterus firm at midline  : voiding spontaneously, lochia WNL  Extremities: no swelling or calf tenderness                            11.1   11.39 )-----------( 307      ( 31 Oct 2021 14:27 )             32.6     10-31    141  |  108  |  7   ----------------------------<  95  3.9   |  23  |  0.54    Ca    9.4      31 Oct 2021 14:27    TPro  7.1  /  Alb  3.7  /  TBili  0.2  /  DBili  x   /  AST  23  /  ALT  14  /  AlkPhos  65  10-31      PT/INR - ( 31 Oct 2021 14:27 )   PT: 10.5 sec;   INR: 0.87          PTT - ( 31 Oct 2021 14:27 )  PTT:29.8 sec  acetaminophen     Tablet .. 975 milliGRAM(s) Oral <User Schedule>  diphenhydrAMINE 25 milliGRAM(s) Oral every 6 hours PRN  diphtheria/tetanus/pertussis (acellular) Vaccine (ADAcel) 0.5 milliLiter(s) IntraMuscular once  enoxaparin Injectable 40 milliGRAM(s) SubCutaneous every 24 hours  hydrALAZINE Injectable 10 milliGRAM(s) IV Push once  hydrALAZINE Injectable 10 milliGRAM(s) IV Push once  ibuprofen  Tablet. 600 milliGRAM(s) Oral every 6 hours  labetalol 300 milliGRAM(s) Oral every 8 hours  lactated ringers. 1000 milliLiter(s) IV Continuous <Continuous>  lanolin Ointment 1 Application(s) Topical every 6 hours PRN  magnesium hydroxide Suspension 30 milliLiter(s) Oral two times a day PRN  NIFEdipine XL 30 milliGRAM(s) Oral every 24 hours  oxyCODONE    IR 5 milliGRAM(s) Oral once PRN  oxyCODONE    IR 5 milliGRAM(s) Oral every 3 hours PRN  oxytocin Infusion 333.333 milliUNIT(s)/Min IV Continuous <Continuous>  oxytocin Infusion 333.333 milliUNIT(s)/Min IV Continuous <Continuous>  simethicone 80 milliGRAM(s) Chew every 4 hours PRN  
Patient evaluated at bedside this morning, resting comfortable in bed.   She reports pain is well controlled.  She denies headache, dizziness, chest pain, palpitations, shortness of breathe, nausea, vomiting or heavy vaginal bleeding.  She has been ambulating without assistance, voiding spontaneously, passing gas, tolerating regular diet and is breastfeeding.    Physical Exam:  Vital Signs Last 24 Hrs  T(C): 36.8 (30 Oct 2021 05:36), Max: 36.8 (30 Oct 2021 05:36)  T(F): 98.3 (30 Oct 2021 05:36), Max: 98.3 (30 Oct 2021 05:36)  HR: 78 (30 Oct 2021 06:42) (68 - 80)  BP: 116/73 (30 Oct 2021 06:42) (116/73 - 131/82)  BP(mean): 88 (30 Oct 2021 06:42) (88 - 88)  RR: 18 (30 Oct 2021 05:36) (18 - 18)  SpO2: 97% (30 Oct 2021 05:36) (97% - 99%)    GA: NAD, A+0 x 3  Abd: + BS, soft, nontender, nondistended, no rebound or guarding, incision clean, dry and intact, uterus firm at midline and below umbilicus  : lochia WNL  Extremities: no swelling or calf tenderness                             11.6   21.89 )-----------( 287      ( 29 Oct 2021 06:38 )             33.8     10-29    136  |  101  |  6<L>  ----------------------------<  128<H>  4.4   |  22  |  0.54    Ca    9.5      29 Oct 2021 06:38    TPro  6.4  /  Alb  3.2<L>  /  TBili  0.3  /  DBili  x   /  AST  21  /  ALT  10  /  AlkPhos  69  10-29        
Patient is a 36y Female seen and evaluated at bedside seen this morning; feeling better hoping to go home soon. Labs noted stable, creatinine normal, sodium ok and K ok.       Meds:    acetaminophen     Tablet .. 975 <User Schedule>  diphenhydrAMINE 25 every 6 hours PRN  diphtheria/tetanus/pertussis (acellular) Vaccine (ADAcel) 0.5 once  enoxaparin Injectable 40 every 24 hours  ibuprofen  Tablet. 600 every 6 hours  labetalol 700 every 8 hours  lactated ringers. 1000 <Continuous>  lanolin Ointment 1 every 6 hours PRN  magnesium hydroxide Suspension 30 two times a day PRN  magnesium sulfate Infusion 1.5 <Continuous>  NIFEdipine XL 60 every 12 hours  oxyCODONE    IR 5 every 3 hours PRN  oxytocin Infusion 333.333 <Continuous>  oxytocin Infusion 333.333 <Continuous>  simethicone 80 every 4 hours PRN      T(C): , Max: 36.8 (11-04-21 @ 14:30)  T(F): , Max: 98.3 (11-04-21 @ 14:30)  HR: 80 (11-05-21 @ 12:00)  BP: 125/81 (11-05-21 @ 12:00)  BP(mean): --  RR: 18 (11-05-21 @ 12:00)  SpO2: 98% (11-05-21 @ 12:00)  Wt(kg): --        Review of Systems:  ROS negative except as per HPI      PHYSICAL EXAM:  GENERAL: Alert, awake, NAD on RA  HEENT: NCAT neck supple, no JVP  CHEST/LUNG: Bilateral clear breath sounds, no rales ronchi or wheezing  HEART: Regular rate and rhythm, no murmur, no gallops, no rub   ABDOMEN: Soft, nontender, non distended  EXTREMITIES: no pedal edema, P      LABS:                        12.4   7.72  )-----------( 445      ( 04 Nov 2021 11:58 )             36.9     11-04    139  |  105  |  10  ----------------------------<  141<H>  3.9   |  21<L>  |  0.65    Ca    9.8      04 Nov 2021 11:58    TPro  7.5  /  Alb  3.9  /  TBili  0.3  /  DBili  x   /  AST  39  /  ALT  54<H>  /  AlkPhos  66  11-04                RADIOLOGY & ADDITIONAL STUDIES:          
Patient seen and evaluated at bedside. She had one severe range BP overnight w/ repeat 15m later mild range. She denies any headaches, vision changes/scotoma, chest pain, palpitations, SOB, RUQ/epigastric pain, or worsening edema. Pt states she has mild abdominal pain, overall controlled with pain medication. She is tolerating reg diet, passing flatus, and voiding. Pt is breastfeeding.  She denies dizziness, nausea, vomiting, or heavy vaginal bleeding.    Physical Exam:  Vital Signs Last 24 Hrs  T(C): 36.8 (31 Oct 2021 05:33), Max: 37.2 (30 Oct 2021 18:00)  T(F): 98.2 (31 Oct 2021 05:33), Max: 98.9 (30 Oct 2021 18:00)  HR: 89 (31 Oct 2021 05:33) (67 - 89)  BP: 131/89 (31 Oct 2021 05:33) (116/73 - 164/100)  BP(mean): 120 (31 Oct 2021 02:30) (88 - 120)  RR: 17 (31 Oct 2021 05:33) (17 - 18)  SpO2: 98% (31 Oct 2021 05:33) (97% - 98%)    GA: comfortable in NAD  Abd: soft, nontender, nondistended, no rebound or guarding, incision clean, dry and intact, uterus firm at midline  : voiding spontaneously, lochia WNL  Extremities: no swelling or calf tenderness                            11.6   21.89 )-----------( 287      ( 29 Oct 2021 06:38 )             33.8     10-29    136  |  101  |  6<L>  ----------------------------<  128<H>  4.4   |  22  |  0.54    Ca    9.5      29 Oct 2021 06:38    TPro  6.4  /  Alb  3.2<L>  /  TBili  0.3  /  DBili  x   /  AST  21  /  ALT  10  /  AlkPhos  69  10-29        acetaminophen     Tablet .. 975 milliGRAM(s) Oral <User Schedule>  diphenhydrAMINE 25 milliGRAM(s) Oral every 6 hours PRN  diphtheria/tetanus/pertussis (acellular) Vaccine (ADAcel) 0.5 milliLiter(s) IntraMuscular once  enoxaparin Injectable 40 milliGRAM(s) SubCutaneous every 24 hours  ibuprofen  Tablet. 600 milliGRAM(s) Oral every 6 hours  labetalol 200 milliGRAM(s) Oral every 24 hours  labetalol 100 milliGRAM(s) Oral every 24 hours  labetalol 100 milliGRAM(s) Oral every 24 hours  lactated ringers. 1000 milliLiter(s) IV Continuous <Continuous>  lanolin Ointment 1 Application(s) Topical every 6 hours PRN  magnesium hydroxide Suspension 30 milliLiter(s) Oral two times a day PRN  oxyCODONE    IR 5 milliGRAM(s) Oral once PRN  oxyCODONE    IR 5 milliGRAM(s) Oral every 3 hours PRN  oxytocin Infusion 333.333 milliUNIT(s)/Min IV Continuous <Continuous>  oxytocin Infusion 333.333 milliUNIT(s)/Min IV Continuous <Continuous>  simethicone 80 milliGRAM(s) Chew every 4 hours PRN  
Patient is a 36y Female seen and evaluated at bedside doing well this morning; hoping to go home. Overnight got a dose of IV hydralazine. BP this morning better with labetalol and nifedipine now.  Labs noted, Cr ok, Sodium ok and K ok.     Meds:    acetaminophen     Tablet .. 975 <User Schedule>  diphenhydrAMINE 25 every 6 hours PRN  diphtheria/tetanus/pertussis (acellular) Vaccine (ADAcel) 0.5 once  enoxaparin Injectable 40 every 24 hours  ibuprofen  Tablet. 600 every 6 hours  labetalol 600 every 8 hours  lactated ringers. 1000 <Continuous>  lanolin Ointment 1 every 6 hours PRN  magnesium hydroxide Suspension 30 two times a day PRN  magnesium sulfate Infusion 1.5 <Continuous>  NIFEdipine XL 30 every 24 hours  NIFEdipine XL 60 every 24 hours  oxyCODONE    IR 5 every 3 hours PRN  oxyCODONE    IR 5 once PRN  oxytocin Infusion 333.333 <Continuous>  oxytocin Infusion 333.333 <Continuous>  simethicone 80 every 4 hours PRN      T(C): , Max: 36.9 (11-03-21 @ 14:30)  T(F): , Max: 98.5 (11-03-21 @ 14:30)  HR: 101 (11-04-21 @ 08:00)  BP: 121/81 (11-04-21 @ 08:00)  BP(mean): --  RR: 18 (11-04-21 @ 08:00)  SpO2: 98% (11-04-21 @ 08:00)  Wt(kg): --      Review of Systems:  10 point ROS negative except as per HPI      PHYSICAL EXAM:  GENERAL: Alert, awake, NAD on RA  HEENT: NCAT neck supple, no JVP  CHEST/LUNG: Bilateral clear breath sounds, no rales ronchi or wheezing  HEART: Regular rate and rhythm, no murmur, no gallops, no rub   ABDOMEN: Soft, nontender, non distended  EXTREMITIES: no pedal edema, WWP    LABS:    11-03    141  |  109<H>  |  10  ----------------------------<  94  4.4   |  22  |  0.60    Ca    9.4      03 Nov 2021 05:56          RADIOLOGY & ADDITIONAL STUDIES:          
Patient is a 36y Female seen and evaluated at bedside seen this morning doing well; patients BP has remained uncontrolled through the night. Discussed with patient about increasing medication dose. Labs noted; Cr stable, Sodium stable and K stable      Meds:    acetaminophen     Tablet .. 975 <User Schedule>  diphenhydrAMINE 25 every 6 hours PRN  diphtheria/tetanus/pertussis (acellular) Vaccine (ADAcel) 0.5 once  enoxaparin Injectable 40 every 24 hours  ibuprofen  Tablet. 600 every 6 hours  labetalol 400 every 8 hours  lactated ringers. 1000 <Continuous>  lanolin Ointment 1 every 6 hours PRN  magnesium hydroxide Suspension 30 two times a day PRN  magnesium sulfate Infusion 1.5 <Continuous>  NIFEdipine XL 30 every 24 hours  NIFEdipine XL 60 every 24 hours  oxyCODONE    IR 5 every 3 hours PRN  oxyCODONE    IR 5 once PRN  oxytocin Infusion 333.333 <Continuous>  oxytocin Infusion 333.333 <Continuous>  simethicone 80 every 4 hours PRN      T(C): , Max: 36.9 (11-03-21 @ 08:00)  T(F): , Max: 98.5 (11-03-21 @ 08:00)  HR: 80 (11-03-21 @ 10:30)  BP: 145/98 (11-03-21 @ 10:30)  BP(mean): 111 (11-03-21 @ 08:00)  RR: 18 (11-03-21 @ 10:30)  SpO2: 97% (11-03-21 @ 10:30)  Wt(kg): --    11-02 @ 07:01  -  11-03 @ 07:00  --------------------------------------------------------  IN: 0 mL / OUT: 2600 mL / NET: -2600 mL          Review of Systems:  10 point ROS negative except as per HPI      PHYSICAL EXAM:  GENERAL: Alert, awake, NAD on RA  HEENT: NCAT neck supple, no JVP  CHEST/LUNG: Bilateral clear breath sounds, no rales ronchi or wheezing  HEART: Regular rate and rhythm, no murmur, no gallops, no rub   ABDOMEN: Soft, nontender, non distended  EXTREMITIES: no pedal edema, [facility]  Neurology: AAOx3, no focal neurological deficit      LABS:                        11.5   9.32  )-----------( 373      ( 02 Nov 2021 08:04 )             34.1     11-03    141  |  109<H>  |  10  ----------------------------<  94  4.4   |  22  |  0.60    Ca    9.4      03 Nov 2021 05:56  Mg     4.6     11-02    TPro  6.7  /  Alb  3.4  /  TBili  0.2  /  DBili  x   /  AST  47<H>  /  ALT  49<H>  /  AlkPhos  68  11-02                RADIOLOGY & ADDITIONAL STUDIES:          
Patient seen and evaluated at bedside. Pt states she has minimal abdominal pain, overall controlled with pain medication. She is tolerating reg diet, passing flatus, having bowel movements, and voiding. Pt is breastfeeding.  She denies headache, vision changes/scotoma, dizziness, chest pain, palpitations, shortness of breath, nausea, vomiting, or heavy vaginal bleeding.    Physical Exam:  Vital Signs Last 24 Hrs  T(C): 36.2 (03 Nov 2021 05:30), Max: 36.7 (02 Nov 2021 10:00)  T(F): 97.1 (03 Nov 2021 05:30), Max: 98.1 (02 Nov 2021 10:00)  HR: 87 (03 Nov 2021 05:30) (71 - 103)  BP: 134/100 (03 Nov 2021 05:30) (130/89 - 165/107)  BP(mean): 18 (02 Nov 2021 18:43) (18 - 116)  RR: 14 (03 Nov 2021 05:30) (14 - 19)  SpO2: 99% (03 Nov 2021 05:30) (97% - 99%)    GA: comfortable in NAD  Abd: soft, nontender, nondistended, no rebound or guarding, incision clean, dry and intact, uterus firm at midline  : voiding spontaneously, lochia WNL  Extremities: mild LE edema b/l, no calf tenderness                            11.5   9.32  )-----------( 373      ( 02 Nov 2021 08:04 )             34.1     11-02    137  |  106  |  6<L>  ----------------------------<  100<H>  4.0   |  22  |  0.48<L>    Ca    7.6<L>      02 Nov 2021 08:04  Mg     4.6     11-02    TPro  6.7  /  Alb  3.4  /  TBili  0.2  /  DBili  x   /  AST  47<H>  /  ALT  49<H>  /  AlkPhos  68  11-02        acetaminophen     Tablet .. 975 milliGRAM(s) Oral <User Schedule>  diphenhydrAMINE 25 milliGRAM(s) Oral every 6 hours PRN  diphtheria/tetanus/pertussis (acellular) Vaccine (ADAcel) 0.5 milliLiter(s) IntraMuscular once  enoxaparin Injectable 40 milliGRAM(s) SubCutaneous every 24 hours  ibuprofen  Tablet. 600 milliGRAM(s) Oral every 6 hours  labetalol 400 milliGRAM(s) Oral every 8 hours  lactated ringers. 1000 milliLiter(s) IV Continuous <Continuous>  lanolin Ointment 1 Application(s) Topical every 6 hours PRN  magnesium hydroxide Suspension 30 milliLiter(s) Oral two times a day PRN  magnesium sulfate Infusion 1.5 Gm/Hr IV Continuous <Continuous>  NIFEdipine XL 30 milliGRAM(s) Oral every 24 hours  NIFEdipine XL 60 milliGRAM(s) Oral every 24 hours  oxyCODONE    IR 5 milliGRAM(s) Oral every 3 hours PRN  oxyCODONE    IR 5 milliGRAM(s) Oral once PRN  oxytocin Infusion 333.333 milliUNIT(s)/Min IV Continuous <Continuous>  oxytocin Infusion 333.333 milliUNIT(s)/Min IV Continuous <Continuous>  simethicone 80 milliGRAM(s) Chew every 4 hours PRN  
Late note 2/2 clinical /patient care  Patient evaluated at bedside for clinical magnesium check.     She denies visual disturbances including scotoma, headache and right upper quadrant pain. Also denies nausea/vomiting/epigastric pain/shortness of breath. Pain well controlled.      T(C): 36.5 (11-02-21 @ 06:15), Max: 36.8 (11-02-21 @ 02:02)  HR: 94 (11-02-21 @ 06:15) (83 - 94)  BP: 143/91 (11-02-21 @ 06:15) (143/91 - 162/103)  RR: 18 (11-02-21 @ 06:15) (16 - 18)  SpO2: 99% (11-02-21 @ 06:15) (96% - 100%)  Wt(kg): --    Gen: NAD  Pulm: CTABL  Abd: soft, nontender, no rebound or guarding, no epigastric tenderness, liver nonpalpable +BS, fundus palpated   : Goel in place  Ext: Reflexes 2+                          11.0   9.63  )-----------( 325      ( 01 Nov 2021 08:05 )             33.1     11-01    139  |  107  |  7   ----------------------------<  94  3.9   |  21<L>  |  0.57    Ca    9.6      01 Nov 2021 08:05  Mg     4.6     11-02    TPro  6.9  /  Alb  3.4  /  TBili  0.2  /  DBili  x   /  AST  41<H>  /  ALT  34  /  AlkPhos  64  11-01 11-01-21 @ 07:01  -  11-02-21 @ 07:00  --------------------------------------------------------  IN: 0 mL / OUT: 2800 mL / NET: -2800 mL            >>> <<<        
Late note 2/2 patient care  Patient evaluated at bedside for clinical magnesium check.     She endorses a Ha which is subsiding after being given reglan. She denies visual disturbances including scotoma, and right upper quadrant pain. Also denies nausea/vomiting/epigastric pain/shortness of breath. Pain well controlled.   Pt had severe range BP of 163/103 2 0934. OB was not paged about severe range BP. Pt was given her dose of labetalol at this time by her RN. Ob was told about BP 30 min later which was mild range 150/104. At this time discussion was had with Dr. Smalls about increasing her Nifedipine dose from 30 to 60mg qd.      T(C): 36.7 (11-01-21 @ 23:30), Max: 37 (11-01-21 @ 18:00)  HR: 83 (11-01-21 @ 23:30) (71 - 97)  BP: 147/97 (11-01-21 @ 23:30) (141/95 - 162/103)  RR: 16 (11-01-21 @ 23:30) (16 - 18)  SpO2: 97% (11-01-21 @ 23:30) (97% - 100%)  Wt(kg): --    Gen: NAD  Pulm: CTABL  Abd: soft, nontender, no rebound or guarding, no epigastric tenderness, liver nonpalpable +BS, fundus palpated   : Gole in place  Ext: Reflexes 2+ reflex b/l                          11.0   9.63  )-----------( 325      ( 01 Nov 2021 08:05 )             33.1     11-01    139  |  107  |  7   ----------------------------<  94  3.9   |  21<L>  |  0.57    Ca    9.6      01 Nov 2021 08:05  Mg     4.3     11-01    TPro  6.9  /  Alb  3.4  /  TBili  0.2  /  DBili  x   /  AST  41<H>  /  ALT  34  /  AlkPhos  64  11-01 11-01-21 @ 07:01  -  11-02-21 @ 00:12  --------------------------------------------------------  IN: 0 mL / OUT: 2000 mL / NET: -2000 mL            >>> <<<        
Overnight she had severe range BP for which she received 10mg IVP hydralazine. She has continued to have mild range BP since that time.     Patient seen and evaluated at bedside with Dr. Oscar. Pt states she has no abdominal pain. She is tolerating reg diet, passing flatus and having bowel movements, and voiding. Pt is breastfeeding.  She currently denies headache vision changes/scotoma, dizziness, chest pain, palpitations, shortness of breath, RUQ/epigastric pain, nausea, vomiting, or heavy vaginal bleeding.    Physical Exam:  Vital Signs Last 24 Hrs  T(C): 36.8 (04 Nov 2021 08:00), Max: 36.9 (03 Nov 2021 14:30)  T(F): 98.3 (04 Nov 2021 08:00), Max: 98.5 (03 Nov 2021 14:30)  HR: 101 (04 Nov 2021 08:00) (75 - 101)  BP: 121/81 (04 Nov 2021 08:00) (113/75 - 155/114)  RR: 18 (04 Nov 2021 08:00) (18 - 20)  SpO2: 98% (04 Nov 2021 08:00) (96% - 100%)    GA: comfortable in NAD  Abd: soft, nontender, nondistended, no rebound or guarding, uterus firm at midline  : voiding spontaneously, lochia WNL  Extremities: no calf tenderness        11-03    141  |  109<H>  |  10  ----------------------------<  94  4.4   |  22  |  0.60    Ca    9.4      03 Nov 2021 05:56          acetaminophen     Tablet .. 975 milliGRAM(s) Oral <User Schedule>  diphenhydrAMINE 25 milliGRAM(s) Oral every 6 hours PRN  diphtheria/tetanus/pertussis (acellular) Vaccine (ADAcel) 0.5 milliLiter(s) IntraMuscular once  enoxaparin Injectable 40 milliGRAM(s) SubCutaneous every 24 hours  ibuprofen  Tablet. 600 milliGRAM(s) Oral every 6 hours  labetalol 600 milliGRAM(s) Oral every 8 hours  lactated ringers. 1000 milliLiter(s) IV Continuous <Continuous>  lanolin Ointment 1 Application(s) Topical every 6 hours PRN  magnesium hydroxide Suspension 30 milliLiter(s) Oral two times a day PRN  magnesium sulfate Infusion 1.5 Gm/Hr IV Continuous <Continuous>  NIFEdipine XL 30 milliGRAM(s) Oral every 24 hours  NIFEdipine XL 60 milliGRAM(s) Oral every 24 hours  oxyCODONE    IR 5 milliGRAM(s) Oral every 3 hours PRN  oxyCODONE    IR 5 milliGRAM(s) Oral once PRN  oxytocin Infusion 333.333 milliUNIT(s)/Min IV Continuous <Continuous>  oxytocin Infusion 333.333 milliUNIT(s)/Min IV Continuous <Continuous>  simethicone 80 milliGRAM(s) Chew every 4 hours PRN  
Patient seen and evaluated at bedside. Overnight pt reported mild headache which was significantly improved w/ reglan. Currently reports mild headache again but she feels this is due to not getting very much sleep last night. States she feels warm which she attributes to the IV magnesium  Pt states she has minmal abdominal pain, overall controlled with pain medication. She is tolerating reg diet, passing flatus, and voiding. She had two bowel movements yesterday.  She denies vision changes/sctotoma, dizziness, chest pain, palpitations, shortness of breath, RUQ/epigastric tenderness, nausea, vomiting, or heavy vaginal bleeding.    Physical Exam:  Vital Signs Last 24 Hrs  T(C): 36.5 (02 Nov 2021 06:15), Max: 37 (01 Nov 2021 18:00)  T(F): 97.7 (02 Nov 2021 06:15), Max: 98.6 (01 Nov 2021 18:00)  HR: 94 (02 Nov 2021 06:15) (71 - 97)  BP: 143/91 (02 Nov 2021 06:15) (141/95 - 162/103)  RR: 18 (02 Nov 2021 06:15) (16 - 18)  SpO2: 99% (02 Nov 2021 06:15) (96% - 100%)    GA: comfortable in NAD  CV: RR, mostly mild range BP overnight, one time severe range BP  Abd: soft, nontender, nondistended, no rebound or guarding, incision clean, dry and intact, uterus firm at midlin, no RUQ/epigastric tenderness  : voiding spontaneously, lochia WNL  Extremities: 1+ edema to mid calves b/l, no calf tenderness                            11.0   9.63  )-----------( 325      ( 01 Nov 2021 08:05 )             33.1     11-01    139  |  107  |  7   ----------------------------<  94  3.9   |  21<L>  |  0.57    Ca    9.6      01 Nov 2021 08:05  Mg     4.6     11-02    TPro  6.9  /  Alb  3.4  /  TBili  0.2  /  DBili  x   /  AST  41<H>  /  ALT  34  /  AlkPhos  64  11-01    Magnesium, Serum: 4.6 mg/dL (11-02 @ 02:54)  Magnesium, Serum: 4.3 mg/dL (11-01 @ 20:01)    PT/INR - ( 31 Oct 2021 14:27 )   PT: 10.5 sec;   INR: 0.87          PTT - ( 31 Oct 2021 14:27 )  PTT:29.8 sec  acetaminophen     Tablet .. 975 milliGRAM(s) Oral <User Schedule>  diphenhydrAMINE 25 milliGRAM(s) Oral every 6 hours PRN  diphtheria/tetanus/pertussis (acellular) Vaccine (ADAcel) 0.5 milliLiter(s) IntraMuscular once  enoxaparin Injectable 40 milliGRAM(s) SubCutaneous every 24 hours  ibuprofen  Tablet. 600 milliGRAM(s) Oral every 6 hours  labetalol 300 milliGRAM(s) Oral every 8 hours  lactated ringers. 1000 milliLiter(s) IV Continuous <Continuous>  lanolin Ointment 1 Application(s) Topical every 6 hours PRN  magnesium hydroxide Suspension 30 milliLiter(s) Oral two times a day PRN  magnesium sulfate Infusion 2 Gm/Hr IV Continuous <Continuous>  NIFEdipine XL 60 milliGRAM(s) Oral every 24 hours  oxyCODONE    IR 5 milliGRAM(s) Oral every 3 hours PRN  oxyCODONE    IR 5 milliGRAM(s) Oral once PRN  oxytocin Infusion 333.333 milliUNIT(s)/Min IV Continuous <Continuous>  oxytocin Infusion 333.333 milliUNIT(s)/Min IV Continuous <Continuous>  simethicone 80 milliGRAM(s) Chew every 4 hours PRN  
Patient seen and evaluated at bedside. Pt denies any abdominal pain. She is tolerating reg diet, passing flatus, having bowel movements, and voiding. Pt is breastfeeding.  She denies headache, vision changes/scotoma, dizziness, chest pain, palpitations, shortness of breath, RUQ/epigastric pain, nausea, vomiting, or heavy vaginal bleeding.    Physical Exam:  Vital Signs Last 24 Hrs  T(C): 36.8 (05 Nov 2021 05:02), Max: 36.8 (04 Nov 2021 08:00)  T(F): 98.2 (05 Nov 2021 05:02), Max: 98.3 (04 Nov 2021 08:00)  HR: 108 (05 Nov 2021 05:02) (83 - 109)  BP: 127/82 (05 Nov 2021 05:02) (121/81 - 170/105)  RR: 16 (05 Nov 2021 05:02) (16 - 18)  SpO2: 98% (05 Nov 2021 05:02) (97% - 99%)    GA: comfortable in NAD  CV: mild tachycardia, normotensive to mild range BP overnight  Abd: soft, nontender, nondistended, no rebound or guarding, incision clean, dry and intact, uterus firm at midline. No RUQ/epigastric tenderss  : voiding spontaneously, lochia WNL  Extremities: improving LE swelling, no calf tenderness                            12.4   7.72  )-----------( 445      ( 04 Nov 2021 11:58 )             36.9     11-04    139  |  105  |  10  ----------------------------<  141<H>  3.9   |  21<L>  |  0.65    Ca    9.8      04 Nov 2021 11:58    TPro  7.5  /  Alb  3.9  /  TBili  0.3  /  DBili  x   /  AST  39  /  ALT  54<H>  /  AlkPhos  66  11-04        acetaminophen     Tablet .. 975 milliGRAM(s) Oral <User Schedule>  diphenhydrAMINE 25 milliGRAM(s) Oral every 6 hours PRN  diphtheria/tetanus/pertussis (acellular) Vaccine (ADAcel) 0.5 milliLiter(s) IntraMuscular once  enoxaparin Injectable 40 milliGRAM(s) SubCutaneous every 24 hours  ibuprofen  Tablet. 600 milliGRAM(s) Oral every 6 hours  labetalol 700 milliGRAM(s) Oral every 8 hours  lactated ringers. 1000 milliLiter(s) IV Continuous <Continuous>  lanolin Ointment 1 Application(s) Topical every 6 hours PRN  magnesium hydroxide Suspension 30 milliLiter(s) Oral two times a day PRN  magnesium sulfate Infusion 1.5 Gm/Hr IV Continuous <Continuous>  NIFEdipine XL 60 milliGRAM(s) Oral every 12 hours  oxyCODONE    IR 5 milliGRAM(s) Oral every 3 hours PRN  oxytocin Infusion 333.333 milliUNIT(s)/Min IV Continuous <Continuous>  oxytocin Infusion 333.333 milliUNIT(s)/Min IV Continuous <Continuous>  simethicone 80 milliGRAM(s) Chew every 4 hours PRN  
Patient seen and evaluated at bedside. Pt states she has minimal abdominal discomfort, overall controlled with pain medication. She had some nausea/vomiting immediately postop last night however she has been tolerating sips of water and gingerale this AM. She is not yet passing flatus, and pressley is in place to come out this AM.   She denies headache, vision change/scotoma, dizziness, chest pain, palpitations, shortness of breath, nausea, vomiting, or heavy vaginal bleeding.    Physical Exam:  Vital Signs Last 24 Hrs  T(C): 36.4 (29 Oct 2021 01:30), Max: 36.8 (28 Oct 2021 22:30)  T(F): 97.5 (29 Oct 2021 01:30), Max: 98.3 (28 Oct 2021 22:30)  HR: 76 (29 Oct 2021 01:30) (56 - 76)  BP: 138/87 (29 Oct 2021 01:30) (103/72 - 153/83)  BP(mean): 104 (29 Oct 2021 01:30) (104 - 104)  RR: 18 (29 Oct 2021 01:30) (17 - 18)  SpO2: 99% (29 Oct 2021 01:30) (97% - 100%)    GA: comfortable in NAD  CV: reg rate, normal to mild range BP overnight  Abd: soft, nontender, nondistended, no rebound or guarding. Pressure dressing removed, incision clean, dry and intact, uterus firm at midline, 2 fb below umbilicus  : pressley in situ draining light yellow urine, lochia WNL  Extremities: no swelling or calf tenderness                            12.1   7.77  )-----------( 284      ( 27 Oct 2021 11:19 )             37.0     10-27    134<L>  |  103  |  7   ----------------------------<  132<H>  3.8   |  20<L>  |  0.53    Ca    9.7      27 Oct 2021 11:19    TPro  7.0  /  Alb  3.6  /  TBili  0.3  /  DBili  x   /  AST  18  /  ALT  12  /  AlkPhos  76  10-27      PT/INR - ( 27 Oct 2021 11:19 )   PT: 11.2 sec;   INR: 0.93          PTT - ( 27 Oct 2021 11:19 )  PTT:24.7 sec  acetaminophen     Tablet .. 975 milliGRAM(s) Oral <User Schedule>  diphenhydrAMINE 25 milliGRAM(s) Oral every 6 hours PRN  diphtheria/tetanus/pertussis (acellular) Vaccine (ADAcel) 0.5 milliLiter(s) IntraMuscular once  enoxaparin Injectable 40 milliGRAM(s) SubCutaneous every 24 hours  ibuprofen  Tablet. 600 milliGRAM(s) Oral every 6 hours  ketorolac   Injectable 30 milliGRAM(s) IV Push every 6 hours  labetalol 200 milliGRAM(s) Oral every 24 hours  labetalol 100 milliGRAM(s) Oral every 24 hours  labetalol 100 milliGRAM(s) Oral every 24 hours  lactated ringers. 1000 milliLiter(s) IV Continuous <Continuous>  lanolin Ointment 1 Application(s) Topical every 6 hours PRN  magnesium hydroxide Suspension 30 milliLiter(s) Oral two times a day PRN  oxyCODONE    IR 5 milliGRAM(s) Oral once PRN  oxyCODONE    IR 5 milliGRAM(s) Oral every 3 hours PRN  oxytocin Infusion 333.333 milliUNIT(s)/Min IV Continuous <Continuous>  oxytocin Infusion 333.333 milliUNIT(s)/Min IV Continuous <Continuous>  simethicone 80 milliGRAM(s) Chew every 4 hours PRN

## 2021-11-05 NOTE — PROGRESS NOTE ADULT - ATTENDING COMMENTS
I:   BP uncontrolled. Labetalol increased to 700 TID. Nifedipine increase to 120.  A: Preeclampsia. BP not at goal.  P: Follow BP. Will consider adding oral hydralazine.
Delayed entry secondary to direct patient care. Patient evaluated at the bedside at 10:30 am.     Patient reports that she feels well. She has not yet ambulated independently but plans to do so. Her catheter is out but she has not yet voided. No flatus but tolerating clears without nausea. She denies fevers, chills, SOB, sore throat, cough, abdominal pain or any other complaints.     Vitals reviewed and are within normal limits. Appropriate physical exam- abdomen is soft and appropriately tender with firm fundus below the umbilicus. Lochia is mild.     Continue with routine postoperative care. Patient to be advanced to regular diet.     Patient is in agreement with the plan and has no additional needs or questions at this time.     MARVEL Mcdonnell MD
Spoke to patient on phone. (Did not examine her prior to DC.) She felt well and was ambulating without lightheadedness. I instructed her to check her BP daily before she takes any of her blood pressure medications and notify me if it was less than 120 or if she felt lightheaded and hold her medications. I instructed her to follow up with me within 1 week and put her in contact with my office. I also gave her my office number, email, and personal cell phone number and instructed her to reach me immediately with any significant changes in her BP or low BP.
Consult reviewed/confirmed.     I: HTN improved. No headaches.  A: Postpartum PEC.   P: Aim for -140/<90. Continue nifedipine and labetalol.
Patient reports that she feels well and that she would really like to go home. She is very happy with her current anti-hypertensive regimen.  She is ambulating without issue, voiding spontaneously, passing gas and tolerating regular diet. She denies headache, vision changes, RUQ pain, fevers, chills, SOB, sore throat, cough, abdominal pain or any other complaints.     Vitals reviewed and are appropriate given anti-hypertensives. Benign physical exam- abdomen is soft and nontender with firm fundus below the umbilicus. Lochia is minimal. Incision is nontender with no drainage.     Patient is cleared for discharge on her current regimen of Labetalol and Nifedipine. She was given strict return precautions. She will follow up with Nephrology within 1 week and with her primary OBGYN with 2 weeks.     Patient is in agreement with the plan and has no additional needs or questions at this time.     MARVEL Mcdonnell MD

## 2021-11-05 NOTE — DISCHARGE NOTE OB - PATIENT PORTAL LINK FT
You can access the FollowMyHealth Patient Portal offered by [facility] by registering at the following website: [email or web address]. By joining "[employer]’s FollowMyHealth portal, you will also be able to view your health information using other applications (apps) compatible with our system.

## 2021-11-05 NOTE — DISCHARGE NOTE OB - CARE PROVIDER_API CALL
Timothy Montelongo)  Obstetrics and Gynecology  [address], [facility], [place] [address]  Phone: [phone]  Fax: [phone]  Follow Up Time:

## 2021-11-05 NOTE — PROGRESS NOTE ADULT - ASSESSMENT
36F with pmhx of HTN who presents for pregnancy c/b PEC w/ SF. Nephrology consulted for HTN management    Assessment/Plan:   #PEC w/SF  Per patient did endorse having symptoms of headache on 11/1; symptoms have abated since then however BP remains elevated. Had BP controlled during pregnancy with the use of labetalol. BP improving with current BP regimen. Noted to be below target goal this AM.   -S/p IV Magnesium  -The goal BP for this patient is SBP of 120-140 and DBP of 70-90   -C/w Nifedipine 60mg Qday  -c/w labetalol 700mg TID; if SBP > 140 or SBP > 90  -avoid NSAIDs, pain control w/Tylenol or other analgesics   -daily BMP   -strict I/Os   -On DC will follow up with Dr. Alec Rayo MD from Nephrology  -On DC advised to check BP prior to taking medication and given parameters.  -Discharge as per primary team.      Lizeth Figueroa D.O.  PGY 4 - Nephrology Fellow  [phone]

## 2021-11-09 ENCOUNTER — APPOINTMENT (OUTPATIENT)
Dept: NEPHROLOGY | Facility: CLINIC | Age: 36
End: 2021-11-09
Payer: COMMERCIAL

## 2021-11-09 LAB — SURGICAL PATHOLOGY STUDY: SIGNIFICANT CHANGE UP

## 2021-11-09 PROCEDURE — 99214 OFFICE O/P EST MOD 30 MIN: CPT | Mod: 95

## 2021-11-09 NOTE — ASSESSMENT
[FreeTextEntry1] : # Chronic HTN with strong family history with superimposed preeclampsia.\par * Aim  - 140s / < 90. Attempt to reduce labetalol to 600 bid. Cont nifedipine. \par * The patient's blood pressure was checked with the [employer] [id number]-[id number] using the SPRINT trial protocol after sitting quietly in an empty room with arm supported, back supported, and feet on the floor for 5 minutes. The average of 3 readings were taken.\par * A counseling information sheet has been given (today). All their questions were answered.\par * The patient has been counseled to check their BP at home with an automatic arm cuff, write down the readings, and reach me directly on the phone immediately if they are persistently > 180 systolic or if SBP is less than 100 or if lightheadedness develops. They were counseled to bring in all blood pressure readings and medications next visit.\par * The patient has been counseled that regular office followup (at least every 2 - 3 weeks for now)  is important for monitoring and for their health, and that it is their responsibility to make follow up appointments.\par * The patient also has been counseled that they must never stop or change any medications without discussing this with me (or another physician). \par \par # Migraines.\par * Cont beta blocker.

## 2021-11-09 NOTE — HISTORY OF PRESENT ILLNESS
[Home] : at home, [unfilled] , at the time of the visit. [Medical Office: ([facility])___] : at the medical office located in  [Verbal consent obtained from patient] : the patient, [unfilled] [FreeTextEntry1] : Following up for recent visit for post-partum preeclampsia.\par \par Discharged on labetalol 700 TID and nifedipine 60 bid with SBP 120s/80s. No lightheadedness. No HA, no blurred vision. HR 70s. She is following her BP every day and watching for lightheadedness. Breastfeeding. \par \par Her BP in the MDs prior to pregnancy 130s/90s. Labetalol was started during pregnancy. During her pregnancy, her BP was 110 - 120s at home and as high as 130s/90s. She has a strong FH of HTN in brother and father which developed at age 30s. \par \par BP in the hospital was as high as > 160/110. \par \par Migraines controlled with labetalol.

## 2021-11-14 DIAGNOSIS — Z28.09 IMMUNIZATION NOT CARRIED OUT BECAUSE OF OTHER CONTRAINDICATION: ICD-10-CM

## 2021-11-14 DIAGNOSIS — Z3A.38 38 WEEKS GESTATION OF PREGNANCY: ICD-10-CM

## 2021-11-14 DIAGNOSIS — D62 ACUTE POSTHEMORRHAGIC ANEMIA: ICD-10-CM

## 2021-11-14 DIAGNOSIS — D25.0 SUBMUCOUS LEIOMYOMA OF UTERUS: ICD-10-CM

## 2021-11-14 DIAGNOSIS — O34.13 MATERNAL CARE FOR BENIGN TUMOR OF CORPUS UTERI, THIRD TRIMESTER: ICD-10-CM

## 2021-11-14 DIAGNOSIS — O61.0 FAILED MEDICAL INDUCTION OF LABOR: ICD-10-CM

## 2021-11-15 ENCOUNTER — APPOINTMENT (OUTPATIENT)
Dept: OBGYN | Facility: CLINIC | Age: 36
End: 2021-11-15
Payer: COMMERCIAL

## 2021-11-15 VITALS
WEIGHT: 187.5 LBS | SYSTOLIC BLOOD PRESSURE: 122 MMHG | BODY MASS INDEX: 29.43 KG/M2 | DIASTOLIC BLOOD PRESSURE: 81 MMHG | HEIGHT: 67 IN

## 2021-11-15 DIAGNOSIS — Z34.90 ENCOUNTER FOR SUPERVISION OF NORMAL PREGNANCY, UNSPECIFIED, UNSPECIFIED TRIMESTER: ICD-10-CM

## 2021-11-15 DIAGNOSIS — Z87.59 PERSONAL HISTORY OF OTHER COMPLICATIONS OF PREGNANCY, CHILDBIRTH AND THE PUERPERIUM: ICD-10-CM

## 2021-11-15 PROCEDURE — 0503F POSTPARTUM CARE VISIT: CPT

## 2021-11-15 NOTE — HISTORY OF PRESENT ILLNESS
[Delivery Date: ___] : on [unfilled] [Primary C/S] : delivered by  section [Male] : Delivery History: baby boy [Wt. ___] : weighing [unfilled] [Back Pain] : back pain [Cracked Nipples] : cracked nipples [Fatigue] : fatigue [Postpartum Follow Up] : postpartum follow up [Complications:___] : no complications [] : delivered by vaginal delivery [Breastfeeding] : not currently nursing [Erythema] : not erythematous [Back to Normal] : is back to normal in size [Mild] : mild vaginal bleeding [Normal] : the vagina was normal [Cervix Sample Taken] : cervical sample not taken for a Pap smear [Not Done] : Examination of breasts not done [Doing Well] : is doing well [No Sign of Infection] : is showing no signs of infection [Excellent Pain Control] : has excellent pain control [None] : None [BF with Difficulty] : nursing without difficulty [Abdominal Pain] : no abdominal pain [Breast Pain] : no breast pain [BreastFeeding Problems] : no breastfeeding problems [Chest Pain] : no chest pain [S/Sx PP Depression] : no signs/symptoms of postpartum depression [Episiotomy Site Pain] : no episiotomy site pain [Heavy Bleeding] : no heavy bleeding [Incisional Drainage] : no incisional drainage [Incisional Pain] : no incisional pain [Irregular Bleeding] : no irregular bleeding [Leg Pain] : no leg pain [Shortness of Breath] : no shortness of breath [Suicidal Ideation] : no suicidal ideation [Vaginal Discharge] : no vaginal discharge [Chills] : no chills [Dysuria] : no dysuria [Fever] : no fever [Headache] : no headache [Nausea] : no nausea [Vomiting] : no vomiting

## 2021-11-23 DIAGNOSIS — N61.0 MASTITIS WITHOUT ABSCESS: ICD-10-CM

## 2021-12-13 ENCOUNTER — APPOINTMENT (OUTPATIENT)
Dept: OBGYN | Facility: CLINIC | Age: 36
End: 2021-12-13
Payer: COMMERCIAL

## 2021-12-13 VITALS
SYSTOLIC BLOOD PRESSURE: 110 MMHG | BODY MASS INDEX: 29.23 KG/M2 | DIASTOLIC BLOOD PRESSURE: 80 MMHG | WEIGHT: 186.63 LBS

## 2021-12-13 PROCEDURE — 0503F POSTPARTUM CARE VISIT: CPT

## 2021-12-13 NOTE — HISTORY OF PRESENT ILLNESS
[Postpartum Follow Up] : postpartum follow up [Delivery Date: ___] : on [unfilled] [Primary C/S] : delivered by  section [Male] : Delivery History: baby boy [Wt. ___] : weighing [unfilled] [Breastfeeding] : currently nursing [Resumed [place]] : has not resumed intercourse [Intended Contraception] : Intended Contraception: [IUD] : intrauterine device [Clean/Dry/Intact] : clean, dry and intact [Healed] : healed [Back to Normal] : is back to normal in size [Mild] : mild vaginal bleeding [Normal] : the vagina was normal [Cervix Sample Taken] : cervical sample not taken for a Pap smear [Not Done] : Examination of breasts not done [Doing Well] : is doing well [No Sign of Infection] : is showing no signs of infection [Excellent Pain Control] : has excellent pain control [None] : None [FreeTextEntry8] : Follow up six weeks post partum exam. [de-identified] : pumping for  while patient has returned to work [de-identified] : Condoms until Paragard IUD is inserted [de-identified] : Patient denies any complaints of post partum recovery or breastfeeding complications. [de-identified] : Minimal vaginal bleeding- patient is unsure if her bleeding stopped and began again such as her period begin again. [de-identified] : No signs of post partum depression, infection, or post partum complications.  [de-identified] : Paragard IUD ordered- patient to RTO for insertion or sooner with any GYN concerns. All questions answered and patient is in agreement and understanding of plan.

## 2021-12-20 DIAGNOSIS — Z97.5 PRESENCE OF (INTRAUTERINE) CONTRACEPTIVE DEVICE: ICD-10-CM

## 2021-12-20 RX ORDER — COPPER 313.4 MG/1
INTRAUTERINE DEVICE INTRAUTERINE
Qty: 1 | Refills: 0 | Status: ACTIVE | COMMUNITY
Start: 2021-12-20 | End: 1900-01-01

## 2022-01-04 ENCOUNTER — APPOINTMENT (OUTPATIENT)
Dept: NEPHROLOGY | Facility: CLINIC | Age: 37
End: 2022-01-04
Payer: COMMERCIAL

## 2022-01-04 PROCEDURE — 99213 OFFICE O/P EST LOW 20 MIN: CPT | Mod: 95

## 2022-01-04 RX ORDER — LABETALOL HYDROCHLORIDE 100 MG/1
100 TABLET, FILM COATED ORAL
Qty: 60 | Refills: 2 | Status: DISCONTINUED | COMMUNITY
Start: 2021-08-26 | End: 2022-01-04

## 2022-01-04 RX ORDER — NIFEDIPINE 60 MG/1
60 TABLET, FILM COATED, EXTENDED RELEASE ORAL
Qty: 60 | Refills: 0 | Status: DISCONTINUED | COMMUNITY
Start: 2021-11-05

## 2022-01-04 RX ORDER — SULFAMETHOXAZOLE AND TRIMETHOPRIM 800; 160 MG/1; MG/1
800-160 TABLET ORAL TWICE DAILY
Qty: 10 | Refills: 0 | Status: DISCONTINUED | COMMUNITY
Start: 2021-11-23 | End: 2022-01-04

## 2022-01-04 RX ORDER — LABETALOL HYDROCHLORIDE 100 MG/1
100 TABLET, FILM COATED ORAL
Qty: 90 | Refills: 4 | Status: DISCONTINUED | COMMUNITY
Start: 2021-10-26 | End: 2022-01-04

## 2022-01-04 RX ORDER — LABETALOL HYDROCHLORIDE 100 MG/1
100 TABLET, FILM COATED ORAL
Qty: 60 | Refills: 2 | Status: DISCONTINUED | COMMUNITY
Start: 2021-03-29 | End: 2022-01-04

## 2022-01-04 NOTE — ASSESSMENT
[FreeTextEntry1] : # Chronic HTN with strong family history with superimposed preeclampsia. BP now controlled. \par * Continue labetalol and nifedipine. \par * A counseling information sheet has been given (today). All their questions were answered.\par * The patient has been counseled to check their BP at home with an automatic arm cuff, write down the readings, and reach me directly on the phone immediately if they are persistently > 180 systolic or if SBP is less than 100 or if lightheadedness develops. They were counseled to bring in all blood pressure readings and medications next visit.\par * The patient has been counseled that regular office followup (at least every 3 months for now) is important for monitoring and for their health, and that it is their responsibility to make follow up appointments.\par * The patient also has been counseled that they must never stop or change any medications without discussing this with me (or another physician). \par \par # Migraines.\par * Cont beta blocker.

## 2022-01-04 NOTE — HISTORY OF PRESENT ILLNESS
[Home] : at home, [unfilled] , at the time of the visit. [Medical Office: ([facility])___] : at the medical office located in  [Verbal consent obtained from patient] : the patient, [unfilled] [FreeTextEntry1] : Following up for recent visit for post-partum preeclampsia. Baby Vj. \par \par * Currently on nifedipine 30 bid and labetalol 600 bid. BP 120s/80s. No lightheadedness. * Breastfeeding. * Migraine controlled on labetalol. * Prior to becoming pregnant, she had a BP of 130s/80s. \par \par Previous history (09Nov21): Discharged on labetalol 700 TID and nifedipine 60 bid with SBP 120s/80s. No lightheadedness. No HA, no blurred vision. HR 70s. She is following her BP every day and watching for lightheadedness. Breastfeeding. \par \par Her BP in the MDs prior to pregnancy 130s/90s. Labetalol was started during pregnancy. During her pregnancy, her BP was 110 - 120s at home and as high as 130s/90s. She has a strong FH of HTN in brother and father which developed at age 30s. \par \par BP in the hospital was as high as > 160/110.

## 2022-03-21 ENCOUNTER — APPOINTMENT (OUTPATIENT)
Dept: OBGYN | Facility: CLINIC | Age: 37
End: 2022-03-21

## 2022-11-08 ENCOUNTER — APPOINTMENT (OUTPATIENT)
Dept: NEPHROLOGY | Facility: CLINIC | Age: 37
End: 2022-11-08

## 2022-11-08 DIAGNOSIS — G43.909 MIGRAINE, UNSPECIFIED, NOT INTRACTABLE, W/OUT STATUS MIGRAINOSUS: ICD-10-CM

## 2022-11-08 DIAGNOSIS — I10 ESSENTIAL (PRIMARY) HYPERTENSION: ICD-10-CM

## 2022-11-08 PROCEDURE — 99441: CPT

## 2022-11-08 RX ORDER — NIFEDIPINE 30 MG/1
30 TABLET, EXTENDED RELEASE ORAL
Qty: 180 | Refills: 3 | Status: ACTIVE | COMMUNITY
Start: 2021-12-05 | End: 1900-01-01

## 2022-11-08 NOTE — ASSESSMENT
[FreeTextEntry1] : # Chronic HTN with strong family history with superimposed preeclampsia. BP now controlled. \par * Continue nifedipine. Decrease labetalol to 400 bid. Target average  - 130.\par * A counseling information sheet on blood pressure and staying healthy has been given (which they have been instructed to read).\par * The patient has been counseled to check their BP at home with an automatic arm cuff, write down the readings, and reach me directly on the phone immediately if they are persistently > 180 systolic or if SBP is less than 100 or if lightheadedness develops. They were counseled to bring in all blood pressure readings and medications next visit.\par * The patient has been counseled that regular office follow-up (at least every 4 months for now)  is important for monitoring and for their health, and that it is their responsibility to make follow up appointments.\par * The patient also has been counseled that they must never stop or change any medications without discussing this with me (or another physician). \par \par # Migraines.\par * Cont beta blocker.

## 2022-11-08 NOTE — HISTORY OF PRESENT ILLNESS
[Home] : at home, [unfilled] , at the time of the visit. [Medical Office: ([facility])___] : at the medical office located in  [Verbal consent obtained from patient] : the patient, [unfilled] [FreeTextEntry1] : Following up for for post-partum preeclampsia. Baby Vj. \par \par * HTN controlled.  - 120s/70s - 80s at home. No lightheadedness. No CP/SOB. Compliant with medications. * Migraine controlled on labetalol. * Prior to becoming pregnant, she had a BP of 130s/80s. \par \par Previous history (04Jan22): * Currently on nifedipine 30 bid and labetalol 600 bid. BP 120s/80s. No lightheadedness. * Breastfeeding. * Migraine controlled on labetalol. * Prior to becoming pregnant, she had a BP of 130s/80s. \par \par Previous history (09Nov21): Discharged on labetalol 700 TID and nifedipine 60 bid with SBP 120s/80s. No lightheadedness. No HA, no blurred vision. HR 70s. She is following her BP every day and watching for lightheadedness. Breastfeeding. \par \par Her BP in the MDs prior to pregnancy 130s/90s. Labetalol was started during pregnancy. During her pregnancy, her BP was 110 - 120s at home and as high as 130s/90s. She has a strong FH of HTN in brother and father which developed at age 30s. \par \par BP in the hospital was as high as > 160/110.

## 2023-03-11 RX ORDER — LABETALOL HYDROCHLORIDE 200 MG/1
200 TABLET, FILM COATED ORAL
Qty: 540 | Refills: 3 | Status: ACTIVE | COMMUNITY
Start: 2021-12-05 | End: 1900-01-01

## 2023-10-12 NOTE — REASON FOR VISIT
Diagnosis:   1. Anemia of chronic renal failure, unspecified CKD stage        Patient arrived for Iron infusion today.     Dr Caraballo is the ordering clinician, therapy plan orders reviewed and signed by clinician.     Vital Signs:  ONC OP Encounter Vitals  BP: 121/71  Heart Rate: 72  Resp: 16  Temp: 98.2 °F (36.8 °C)  Temp src: Oral  SpO2: 100 %  Weight: 77.1 kg (169 lb 15.6 oz)      Allergies:    ALLERGIES:   Allergen Reactions   • Lisinopril SWELLING         Labs reviewed with the patient: Yes    Nursing Summary:  Patient tolerated treatment well. No complications before, during or after infusion.      Patient condition stable during treatment? Yes  Questions were answered and understanding was verbalized. Yes   Education provided Yes    Patient advised on follow up, any and all questions answered.  Patient Discharged to home Ambulatory with self   [FreeTextEntry1] : HTN

## 2024-11-11 ENCOUNTER — NON-APPOINTMENT (OUTPATIENT)
Age: 39
End: 2024-11-11

## 2024-11-11 ENCOUNTER — APPOINTMENT (OUTPATIENT)
Dept: OBGYN | Facility: CLINIC | Age: 39
End: 2024-11-11
Payer: COMMERCIAL

## 2024-11-11 VITALS
SYSTOLIC BLOOD PRESSURE: 120 MMHG | BODY MASS INDEX: 24.01 KG/M2 | DIASTOLIC BLOOD PRESSURE: 82 MMHG | HEART RATE: 82 BPM | HEIGHT: 67 IN | OXYGEN SATURATION: 99 % | WEIGHT: 153 LBS

## 2024-11-11 DIAGNOSIS — Z01.419 ENCOUNTER FOR GYNECOLOGICAL EXAMINATION (GENERAL) (ROUTINE) W/OUT ABNORMAL FINDINGS: ICD-10-CM

## 2024-11-11 PROCEDURE — 99396 PREV VISIT EST AGE 40-64: CPT

## 2024-11-12 LAB
ALBUMIN SERPL ELPH-MCNC: 4.4 G/DL
ALP BLD-CCNC: 49 U/L
ALT SERPL-CCNC: 12 U/L
ANION GAP SERPL CALC-SCNC: 12 MMOL/L
AST SERPL-CCNC: 24 U/L
BASOPHILS # BLD AUTO: 0.04 K/UL
BASOPHILS NFR BLD AUTO: 0.5 %
BILIRUB SERPL-MCNC: 0.4 MG/DL
BUN SERPL-MCNC: 12 MG/DL
CALCIUM SERPL-MCNC: 9.6 MG/DL
CHLORIDE SERPL-SCNC: 105 MMOL/L
CO2 SERPL-SCNC: 23 MMOL/L
CREAT SERPL-MCNC: 0.86 MG/DL
EGFR: 87 ML/MIN/1.73M2
EOSINOPHIL # BLD AUTO: 0.13 K/UL
EOSINOPHIL NFR BLD AUTO: 1.6 %
FSH SERPL-MCNC: 6.4 IU/L
GLUCOSE SERPL-MCNC: 100 MG/DL
HCT VFR BLD CALC: 38.6 %
HGB BLD-MCNC: 12.4 G/DL
IMM GRANULOCYTES NFR BLD AUTO: 0.2 %
LH SERPL-ACNC: 5.3 IU/L
LYMPHOCYTES # BLD AUTO: 1.27 K/UL
LYMPHOCYTES NFR BLD AUTO: 15.4 %
MAN DIFF?: NORMAL
MCHC RBC-ENTMCNC: 29.8 PG
MCHC RBC-ENTMCNC: 32.1 G/DL
MCV RBC AUTO: 92.8 FL
MONOCYTES # BLD AUTO: 0.45 K/UL
MONOCYTES NFR BLD AUTO: 5.4 %
NEUTROPHILS # BLD AUTO: 6.36 K/UL
NEUTROPHILS NFR BLD AUTO: 76.9 %
PLATELET # BLD AUTO: 278 K/UL
POTASSIUM SERPL-SCNC: 4.5 MMOL/L
PROT SERPL-MCNC: 7.3 G/DL
RBC # BLD: 4.16 M/UL
RBC # FLD: 14.3 %
SODIUM SERPL-SCNC: 139 MMOL/L
TSH SERPL-ACNC: 2.33 UIU/ML
WBC # FLD AUTO: 8.27 K/UL

## 2024-11-14 LAB — CYTOLOGY CVX/VAG DOC THIN PREP: NORMAL

## 2024-11-25 ENCOUNTER — APPOINTMENT (OUTPATIENT)
Dept: INTERNAL MEDICINE | Facility: CLINIC | Age: 39
End: 2024-11-25
Payer: COMMERCIAL

## 2024-11-25 VITALS
WEIGHT: 158 LBS | BODY MASS INDEX: 24.8 KG/M2 | SYSTOLIC BLOOD PRESSURE: 128 MMHG | TEMPERATURE: 98.4 F | HEART RATE: 84 BPM | HEIGHT: 67 IN | DIASTOLIC BLOOD PRESSURE: 88 MMHG

## 2024-11-25 DIAGNOSIS — Z82.0 FAMILY HISTORY OF EPILEPSY AND OTHER DISEASES OF THE NERVOUS SYSTEM: ICD-10-CM

## 2024-11-25 DIAGNOSIS — R09.81 NASAL CONGESTION: ICD-10-CM

## 2024-11-25 DIAGNOSIS — Z87.898 PERSONAL HISTORY OF OTHER SPECIFIED CONDITIONS: ICD-10-CM

## 2024-11-25 DIAGNOSIS — F32.A DEPRESSION, UNSPECIFIED: ICD-10-CM

## 2024-11-25 PROCEDURE — 99213 OFFICE O/P EST LOW 20 MIN: CPT | Mod: 25

## 2024-11-25 PROCEDURE — 36415 COLL VENOUS BLD VENIPUNCTURE: CPT

## 2024-11-25 PROCEDURE — 99386 PREV VISIT NEW AGE 40-64: CPT

## 2024-11-25 RX ORDER — FLUTICASONE PROPIONATE 50 UG/1
50 SPRAY, METERED NASAL
Qty: 1 | Refills: 0 | Status: ACTIVE | COMMUNITY
Start: 2024-11-25 | End: 1900-01-01

## 2024-11-25 RX ORDER — BUPROPION HYDROCHLORIDE 300 MG/1
TABLET, EXTENDED RELEASE ORAL DAILY
Refills: 0 | Status: ACTIVE | COMMUNITY

## 2024-11-25 NOTE — HEALTH RISK ASSESSMENT
[Yes] : Yes [2 - 4 times a month (2 pts)] : 2-4 times a month (2 points) [1 or 2 (0 pts)] : 1 or 2 (0 points) [Never (0 pts)] : Never (0 points) [Little interest or pleasure doing things] : 1) Little interest or pleasure doing things [Feeling down, depressed, or hopeless] : 2) Feeling down, depressed, or hopeless [0] : 2) Feeling down, depressed, or hopeless: Not at all (0) [PHQ-2 Negative - No further assessment needed] : PHQ-2 Negative - No further assessment needed [Never] : Never [No] : In the past 12 months have you used drugs other than those required for medical reasons? No [Audit-CScore] : 2 [de-identified] : Gym 4-5x a week (weight lifting, cardio), walking  [de-identified] : Cooks at home a lot; generally healthy well balanced diet focused on protein and fiber  [OTB0Lmdtw] : 0

## 2024-11-25 NOTE — HISTORY OF PRESENT ILLNESS
[FreeTextEntry1] : Spring is a 38yo woman who is here to establish primary care and for annual CPE [de-identified] : #Chronic HTN with strong family history #History of preeclampsia ( ) Chart reviewed: Saw nephro in  Nifedipine 30mg BID and Labetalol 600mg BID - needs refills   #Depression Wellbutrin XL 450mg Has a therapist Sleep: Could do better in terms of duration as sleeps for 6.5-7h but quality is good   #Sinus congestion - chronic Describing symptoms of intermittent nasal sinus congestion as well as running nose, PND, phlegm production but unable to drive it out  No known allergies  Has never tried Flonase   #Chronic hair thinning Wondering if age-related Taking Nutrafol   #Chronic migraines Now 2x a year  #Gyn/Sexual Hx Menses: Cycles are regular (every 20 days, for 10 days; pattern changed in 6 months; has fibroid uterus, saw Gyn, will be doing TVUS Sexually active: Yes -  Contraception: No  History of STIs: No   c/b pre-eclampsia (mainly postpartum), no GDM; has a healthy 2yo boy   #HCM - Pap smear: UTD - 2024, NILM - Mammogram: Ordered by Gyn - Vaccines:  - Labs: CBC, CMP, TSH done 2024

## 2024-11-25 NOTE — PHYSICAL EXAM
[No Acute Distress] : no acute distress [Well Nourished] : well nourished [Well Developed] : well developed [Well-Appearing] : well-appearing [Normal Voice/Communication] : normal voice/communication [Normal Sclera/Conjunctiva] : normal sclera/conjunctiva [EOMI] : extraocular movements intact [Normal Outer Ear/Nose] : the outer ears and nose were normal in appearance [Normal Oropharynx] : the oropharynx was normal [Supple] : supple [Thyroid Normal, No Nodules] : the thyroid was normal and there were no nodules present [No Respiratory Distress] : no respiratory distress  [No Accessory Muscle Use] : no accessory muscle use [Clear to Auscultation] : lungs were clear to auscultation bilaterally [Normal Rate] : normal rate  [Regular Rhythm] : with a regular rhythm [Normal S1, S2] : normal S1 and S2 [No Murmur] : no murmur heard [No Edema] : there was no peripheral edema [Soft] : abdomen soft [Non Tender] : non-tender [Non-distended] : non-distended [No CVA Tenderness] : no CVA  tenderness [No Joint Swelling] : no joint swelling [No Rash] : no rash [Normal Gait] : normal gait [Speech Grossly Normal] : speech grossly normal [Memory Grossly Normal] : memory grossly normal [Normal Affect] : the affect was normal [Alert and Oriented x3] : oriented to person, place, and time [Normal Mood] : the mood was normal [Normal Insight/Judgement] : insight and judgment were intact [de-identified] : No facial tenderness

## 2024-11-25 NOTE — ASSESSMENT
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 Advised 7-9 hours based on how well-rested one feels with consistent sleep-wake timings.  #Nasal sinus congestion - chronic - Trial of Flonase   #Chronic hair thinning TSH wnl - Check iron panel, B12, folate - Diet optimized for protein and fiber - C/w Nutrafol   #Fibroid uterus with recent changes in menstruation patter over 6 months - Saw Gyn, will be doing TVUS  #HCM - Pap smear: UTD - Nov 2024, NILM - Mammogram: Ordered by Gyn - Vaccines:  - Labs as ordered

## 2024-11-25 NOTE — REVIEW OF SYSTEMS
[Hair Changes] : hair changes [Chest Pain] : no chest pain [Shortness Of Breath] : no shortness of breath [Constipation] : no constipation [Diarrhea] : diarrhea [Headache] : no headache [Dizziness] : no dizziness

## 2024-11-26 ENCOUNTER — TRANSCRIPTION ENCOUNTER (OUTPATIENT)
Age: 39
End: 2024-11-26

## 2024-11-26 LAB
CHOLEST SERPL-MCNC: 139 MG/DL
ESTIMATED AVERAGE GLUCOSE: 111 MG/DL
FERRITIN SERPL-MCNC: 35 NG/ML
FOLATE SERPL-MCNC: 9 NG/ML
HBA1C MFR BLD HPLC: 5.5 %
HDLC SERPL-MCNC: 60 MG/DL
IRON SATN MFR SERPL: 17 %
IRON SERPL-MCNC: 47 UG/DL
LDLC SERPL CALC-MCNC: 71 MG/DL
NONHDLC SERPL-MCNC: 79 MG/DL
TIBC SERPL-MCNC: 283 UG/DL
TRIGL SERPL-MCNC: 34 MG/DL
UIBC SERPL-MCNC: 236 UG/DL
VIT B12 SERPL-MCNC: 644 PG/ML

## 2025-01-14 ENCOUNTER — TRANSCRIPTION ENCOUNTER (OUTPATIENT)
Age: 40
End: 2025-01-14

## 2025-05-15 ENCOUNTER — TRANSCRIPTION ENCOUNTER (OUTPATIENT)
Age: 40
End: 2025-05-15